# Patient Record
Sex: MALE | Race: WHITE | Employment: OTHER | URBAN - METROPOLITAN AREA
[De-identification: names, ages, dates, MRNs, and addresses within clinical notes are randomized per-mention and may not be internally consistent; named-entity substitution may affect disease eponyms.]

---

## 2017-01-03 ENCOUNTER — APPOINTMENT (OUTPATIENT)
Dept: GENERAL RADIOLOGY | Age: 82
End: 2017-01-03
Attending: EMERGENCY MEDICINE

## 2017-01-03 ENCOUNTER — HOSPITAL ENCOUNTER (EMERGENCY)
Age: 82
Discharge: HOME | End: 2017-01-03
Attending: EMERGENCY MEDICINE

## 2017-01-03 VITALS
HEIGHT: 65 IN | HEART RATE: 70 BPM | BODY MASS INDEX: 25.83 KG/M2 | WEIGHT: 155 LBS | DIASTOLIC BLOOD PRESSURE: 72 MMHG | RESPIRATION RATE: 16 BRPM | SYSTOLIC BLOOD PRESSURE: 144 MMHG | OXYGEN SATURATION: 96 % | TEMPERATURE: 98 F

## 2017-01-03 DIAGNOSIS — R09.89 FOREIGN BODY SENSATION IN THROAT: Primary | ICD-10-CM

## 2017-01-03 LAB
ATRIAL RATE: 69 BPM
P AXIS: 53 DEGREES
P-R INTERVAL: 160 MS
Q-T INTERVAL: 392 MS
QRS DURATION: 94 MS
QTC CALCULATION (BEZET): 420 MS
R AXIS: -45 DEGREES
T AXIS: 21 DEGREES
VENTRICULAR RATE: 69 BPM

## 2017-01-03 PROCEDURE — 99284 EMERGENCY DEPT VISIT MOD MDM: CPT

## 2017-01-03 PROCEDURE — 93010 ELECTROCARDIOGRAM REPORT: CPT

## 2017-01-03 PROCEDURE — 93005 ELECTROCARDIOGRAM TRACING: CPT

## 2017-01-03 PROCEDURE — 70360 X-RAY EXAM OF NECK: CPT

## 2017-01-03 NOTE — ED PROVIDER NOTES
Patient Seen in: THE [facility] Emergency Department In [place]    History   Patient presents with:  Sore Throat    Stated Complaint: c/o \"flu-like Sx 2-3 weeks - better but now c/o throat pain-swelling in throat\"    HPI    15-year-old male who recovered fr needed. benzonatate (TESSALON) 200 MG Oral Cap,  Take 1 capsule (200 mg total) by mouth 3 (three) times daily as needed. AMLODIPINE BESYLATE 2.5 MG Oral Tab,  TAKE 1 TABLET BY MOUTH DAILY.    Lovastatin 10 MG Oral Tab,  Take 1 tablet (10 mg total) by mo Current:/72 mmHg  Pulse 88  Temp(Src) 98.2 °F (36.8 °C) (Temporal)  Resp 16  Ht 165.1 cm (5' 5\")  Wt 70.308 kg  BMI 25.79 kg/m2  SpO2 96%        Physical Exam   Constitutional: He is oriented to person, place, and time.  He appears well-develop relaxed position reading a book. He is in no respiratory distress. There is no stridor there is no change in his voice. He is tolerating p.o. A screening EKG was done because of age and is unremarkable.   Furthermore x-rays do not show any abnormalities

## 2017-01-04 RX ORDER — LORAZEPAM 0.5 MG/1
TABLET ORAL
Qty: 90 TABLET | Refills: 0 | Status: SHIPPED | OUTPATIENT
Start: 2017-01-04 | End: 2017-10-26

## 2017-01-21 ENCOUNTER — HOSPITAL ENCOUNTER (OUTPATIENT)
Age: 82
Discharge: ANOTHER HOSPITAL | End: 2017-01-21
Attending: FAMILY MEDICINE
Payer: MEDICARE

## 2017-01-21 ENCOUNTER — APPOINTMENT (OUTPATIENT)
Dept: GENERAL RADIOLOGY | Facility: HOSPITAL | Age: 82
End: 2017-01-21
Payer: MEDICARE

## 2017-01-21 ENCOUNTER — APPOINTMENT (OUTPATIENT)
Dept: CT IMAGING | Facility: HOSPITAL | Age: 82
End: 2017-01-21
Attending: EMERGENCY MEDICINE
Payer: MEDICARE

## 2017-01-21 ENCOUNTER — HOSPITAL ENCOUNTER (EMERGENCY)
Facility: HOSPITAL | Age: 82
Discharge: HOME | End: 2017-01-21
Attending: EMERGENCY MEDICINE
Payer: MEDICARE

## 2017-01-21 VITALS
OXYGEN SATURATION: 97 % | TEMPERATURE: 98 F | HEART RATE: 100 BPM | WEIGHT: 155 LBS | BODY MASS INDEX: 26 KG/M2 | DIASTOLIC BLOOD PRESSURE: 84 MMHG | RESPIRATION RATE: 18 BRPM | SYSTOLIC BLOOD PRESSURE: 168 MMHG

## 2017-01-21 VITALS
RESPIRATION RATE: 16 BRPM | SYSTOLIC BLOOD PRESSURE: 148 MMHG | HEART RATE: 78 BPM | TEMPERATURE: 98 F | DIASTOLIC BLOOD PRESSURE: 71 MMHG | OXYGEN SATURATION: 97 %

## 2017-01-21 DIAGNOSIS — M79.622 PAIN IN BOTH UPPER ARMS: ICD-10-CM

## 2017-01-21 DIAGNOSIS — N28.89 LEFT KIDNEY MASS: ICD-10-CM

## 2017-01-21 DIAGNOSIS — M54.9 UPPER BACK PAIN: Primary | ICD-10-CM

## 2017-01-21 DIAGNOSIS — M54.9 MID BACK PAIN: Primary | ICD-10-CM

## 2017-01-21 DIAGNOSIS — M79.621 PAIN IN BOTH UPPER ARMS: ICD-10-CM

## 2017-01-21 LAB
ALBUMIN SERPL-MCNC: 4.1 G/DL (ref 3.5–4.8)
ALP LIVER SERPL-CCNC: 66 U/L (ref 45–117)
ALT SERPL-CCNC: 38 U/L (ref 17–63)
APTT PPP: 29.8 SECONDS (ref 25–34)
AST SERPL-CCNC: 27 U/L (ref 15–41)
ATRIAL RATE: 94 BPM
ATRIAL RATE: 95 BPM
BASOPHILS # BLD AUTO: 0.02 X10(3) UL (ref 0–0.1)
BASOPHILS NFR BLD AUTO: 0.5 %
BILIRUB SERPL-MCNC: 0.7 MG/DL (ref 0.1–2)
BUN BLD-MCNC: 29 MG/DL (ref 8–20)
CALCIUM BLD-MCNC: 9.4 MG/DL (ref 8.3–10.3)
CHLORIDE: 103 MMOL/L (ref 101–111)
CO2: 30 MMOL/L (ref 22–32)
CREAT BLD-MCNC: 1.33 MG/DL (ref 0.7–1.3)
EOSINOPHIL # BLD AUTO: 0.07 X10(3) UL (ref 0–0.3)
EOSINOPHIL NFR BLD AUTO: 1.7 %
ERYTHROCYTE [DISTWIDTH] IN BLOOD BY AUTOMATED COUNT: 14 % (ref 11.5–16)
GLUCOSE BLD-MCNC: 129 MG/DL (ref 70–99)
HCT VFR BLD AUTO: 44 % (ref 37–53)
HGB BLD-MCNC: 15.1 G/DL (ref 13–17)
IMMATURE GRANULOCYTE COUNT: 0.05 X10(3) UL (ref 0–1)
IMMATURE GRANULOCYTE RATIO %: 1.2 %
INR BLD: 0.88 (ref 0.89–1.12)
LYMPHOCYTES # BLD AUTO: 0.83 X10(3) UL (ref 0.9–4)
LYMPHOCYTES NFR BLD AUTO: 19.9 %
M PROTEIN MFR SERPL ELPH: 7.7 G/DL (ref 6.1–8.3)
MCH RBC QN AUTO: 31.5 PG (ref 27–33.2)
MCHC RBC AUTO-ENTMCNC: 34.3 G/DL (ref 31–37)
MCV RBC AUTO: 91.7 FL (ref 80–99)
MONOCYTES # BLD AUTO: 0.38 X10(3) UL (ref 0.1–0.6)
MONOCYTES NFR BLD AUTO: 9.1 %
NEUTROPHIL ABS PRELIM: 2.83 X10 (3) UL (ref 1.3–6.7)
NEUTROPHILS # BLD AUTO: 2.83 X10(3) UL (ref 1.3–6.7)
NEUTROPHILS NFR BLD AUTO: 67.6 %
P AXIS: 62 DEGREES
P AXIS: 66 DEGREES
P-R INTERVAL: 156 MS
P-R INTERVAL: 156 MS
PLATELET # BLD AUTO: 244 10(3)UL (ref 150–450)
POTASSIUM SERPL-SCNC: 3.7 MMOL/L (ref 3.6–5.1)
PSA SERPL DL<=0.01 NG/ML-MCNC: 12.2 SECONDS (ref 12.3–14.8)
Q-T INTERVAL: 340 MS
Q-T INTERVAL: 344 MS
QRS DURATION: 90 MS
QRS DURATION: 92 MS
QTC CALCULATION (BEZET): 427 MS
QTC CALCULATION (BEZET): 430 MS
R AXIS: -43 DEGREES
R AXIS: -53 DEGREES
RBC # BLD AUTO: 4.8 X10(6)UL (ref 3.8–5.8)
RED CELL DISTRIBUTION WIDTH-SD: 47 FL (ref 35.1–46.3)
SODIUM SERPL-SCNC: 140 MMOL/L (ref 136–144)
T AXIS: 13 DEGREES
T AXIS: 34 DEGREES
TROPONIN: <0.046 NG/ML (ref ?–0.05)
VENTRICULAR RATE: 94 BPM
VENTRICULAR RATE: 95 BPM
WBC # BLD AUTO: 4.2 X10(3) UL (ref 4–13)

## 2017-01-21 PROCEDURE — 85730 THROMBOPLASTIN TIME PARTIAL: CPT | Performed by: EMERGENCY MEDICINE

## 2017-01-21 PROCEDURE — 93005 ELECTROCARDIOGRAM TRACING: CPT

## 2017-01-21 PROCEDURE — 99215 OFFICE O/P EST HI 40 MIN: CPT

## 2017-01-21 PROCEDURE — 99285 EMERGENCY DEPT VISIT HI MDM: CPT

## 2017-01-21 PROCEDURE — 71010 XR CHEST AP PORTABLE  (CPT=71010): CPT

## 2017-01-21 PROCEDURE — 85025 COMPLETE CBC W/AUTO DIFF WBC: CPT

## 2017-01-21 PROCEDURE — 85610 PROTHROMBIN TIME: CPT | Performed by: EMERGENCY MEDICINE

## 2017-01-21 PROCEDURE — 96361 HYDRATE IV INFUSION ADD-ON: CPT

## 2017-01-21 PROCEDURE — 4040F PNEUMOC VAC/ADMIN/RCVD: CPT

## 2017-01-21 PROCEDURE — 84484 ASSAY OF TROPONIN QUANT: CPT

## 2017-01-21 PROCEDURE — 93010 ELECTROCARDIOGRAM REPORT: CPT

## 2017-01-21 PROCEDURE — 80053 COMPREHEN METABOLIC PANEL: CPT

## 2017-01-21 PROCEDURE — 71275 CT ANGIOGRAPHY CHEST: CPT

## 2017-01-21 PROCEDURE — 74175 CTA ABDOMEN W/CONTRAST: CPT

## 2017-01-21 PROCEDURE — 96374 THER/PROPH/DIAG INJ IV PUSH: CPT

## 2017-01-21 RX ORDER — ASPIRIN 81 MG/1
324 TABLET, CHEWABLE ORAL ONCE
Status: COMPLETED | OUTPATIENT
Start: 2017-01-21 | End: 2017-01-21

## 2017-01-21 RX ORDER — ONDANSETRON 2 MG/ML
4 INJECTION INTRAMUSCULAR; INTRAVENOUS ONCE
Status: COMPLETED | OUTPATIENT
Start: 2017-01-21 | End: 2017-01-21

## 2017-01-21 RX ORDER — SODIUM CHLORIDE 9 MG/ML
1000 INJECTION, SOLUTION INTRAVENOUS ONCE
Status: COMPLETED | OUTPATIENT
Start: 2017-01-21 | End: 2017-01-21

## 2017-01-21 NOTE — ED INITIAL ASSESSMENT (HPI)
Pt to ED from [address] [place] [address] with c/o mid upper back pain since yesterday evening. Pt states last night the pain radiated down both arms, denies arm pain today. Pt c/o nausea xseveral weeks.

## 2017-01-21 NOTE — ED PROVIDER NOTES
Patient Seen in: [address] [facility]    History   Patient presents with:  Chest Pain Angina (cardiovascular)    Stated Complaint: YUSEF SHOULDER BLADE PAIN/YUSEF.  ARM PAIN    HPI    40-year-old male who presents to the clinic today with chief com repair       Medications :   LORAZEPAM 0.5 MG Oral Tab,  TAKE 1 TABLET BY MOUTH THREE TIMES DAILY AS NEEDED   Albuterol Sulfate HFA (PROAIR HFA) 108 (90 BASE) MCG/ACT Inhalation Aero Soln,  Inhale 2 puffs into the lungs every 6 (six) hours as needed for [employer] air)       Current:/84 mmHg  Pulse 100  Temp(Src) 98 °F (36.7 °C) (Temporal)  Resp 18  Wt 70.308 kg  SpO2 97%        Physical Exam    General appearance: alert, appears stated age and cooperative  Head: Normocephalic, without obvious abnormality, atr pain  (primary encounter diagnosis)  Pain in both upper arms    Disposition:   Ic to ed    Follow-up:  [facility] [phone]  Go to  For higher level of care and further evaluation      Medications

## 2017-01-21 NOTE — ED NOTES
Pt and family discussing transfer to [facility] in [place]. [address] ambulance called for transport to ED. Family at bedside.

## 2017-01-21 NOTE — ED INITIAL ASSESSMENT (HPI)
Pt presents to [facility] with an episode of upper back and bilateral arm pain last night. Pt states he has had some chest pain off and on recently, none today. Pt states he is having pain between his shoulder blades at this time, rated 2/10.  Pt denies arm pain o

## 2017-01-22 NOTE — ED PROVIDER NOTES
Patient Seen in: [facility] Emergency Department    History   Patient presents with:  Back Pain (musculoskeletal)    Stated Complaint: back pain    HPI    Patient is an 80-year-old male who states last night around 3 AM he woke up with sharp pain be Aero Soln,  Inhale 2 puffs into the lungs every 6 (six) hours as needed for Wheezing. AMLODIPINE BESYLATE 2.5 MG Oral Tab,  TAKE 1 TABLET BY MOUTH DAILY. Lovastatin 10 MG Oral Tab,  Take 1 tablet (10 mg total) by mouth nightly.    HYDROCHLOROTHIAZIDE 25 Extraocular muscles intact, pupils equal round reactive to light and accommodation. Mouth normal, neck supple, no meningismus. LUNGS: Lungs clear to auscultation bilaterally.   Mild tenderness right parascapular spinal musculature, no crepitus or erythema results for these tests on the individual orders. RAINBOW DRAW BLUE   RAINBOW DRAW GOLD   RAINBOW DRAW LAVENDER   RAINBOW DRAW LIGHT GREEN      EKG    Rate, intervals and axes as noted on EKG Report.   Rate: 94  Rhythm: Sinus Rhythm  Reading: Normal sinus

## 2017-02-01 RX ORDER — IRBESARTAN 300 MG/1
TABLET ORAL
Qty: 90 TABLET | Refills: 3 | Status: SHIPPED | OUTPATIENT
Start: 2017-02-01 | End: 2018-01-31

## 2017-02-01 NOTE — TELEPHONE ENCOUNTER
Last refill: 8-28-15 6 refills, discontinued  Last Visit: 12-21-16  Next Visit: No future appointments. Forward to Dr. Cathern Hatchet please advise on refills. Thanks.

## 2017-03-01 ENCOUNTER — OFFICE VISIT (OUTPATIENT)
Dept: FAMILY MEDICINE CLINIC | Facility: CLINIC | Age: 82
End: 2017-03-01

## 2017-03-01 VITALS
BODY MASS INDEX: 25.75 KG/M2 | HEART RATE: 80 BPM | HEIGHT: 64 IN | DIASTOLIC BLOOD PRESSURE: 70 MMHG | RESPIRATION RATE: 14 BRPM | TEMPERATURE: 98 F | SYSTOLIC BLOOD PRESSURE: 180 MMHG | WEIGHT: 150.81 LBS

## 2017-03-01 DIAGNOSIS — I10 ESSENTIAL HYPERTENSION, BENIGN: ICD-10-CM

## 2017-03-01 DIAGNOSIS — I10 WHITE COAT SYNDROME WITH HYPERTENSION: ICD-10-CM

## 2017-03-01 DIAGNOSIS — C67.9 MALIGNANT NEOPLASM OF URINARY BLADDER, UNSPECIFIED SITE (HCC): Primary | ICD-10-CM

## 2017-03-01 DIAGNOSIS — N28.89 KIDNEY MASS: ICD-10-CM

## 2017-03-01 DIAGNOSIS — R39.89 BLADDER PAIN: ICD-10-CM

## 2017-03-01 DIAGNOSIS — N40.1 BENIGN PROSTATIC HYPERPLASIA WITH LOWER URINARY TRACT SYMPTOMS, UNSPECIFIED MORPHOLOGY: ICD-10-CM

## 2017-03-01 PROCEDURE — 99215 OFFICE O/P EST HI 40 MIN: CPT | Performed by: FAMILY MEDICINE

## 2017-03-01 RX ORDER — PHENAZOPYRIDINE HYDROCHLORIDE 100 MG/1
100 TABLET, FILM COATED ORAL 3 TIMES DAILY PRN
Qty: 30 TABLET | Refills: 1 | Status: SHIPPED | OUTPATIENT
Start: 2017-03-01 | End: 2017-09-11

## 2017-03-18 ENCOUNTER — MED REC SCAN ONLY (OUTPATIENT)
Dept: FAMILY MEDICINE CLINIC | Facility: CLINIC | Age: 82
End: 2017-03-18

## 2017-06-05 ENCOUNTER — TELEPHONE (OUTPATIENT)
Dept: FAMILY MEDICINE CLINIC | Facility: CLINIC | Age: 82
End: 2017-06-05

## 2017-06-05 ENCOUNTER — NURSE ONLY (OUTPATIENT)
Dept: FAMILY MEDICINE CLINIC | Facility: CLINIC | Age: 82
End: 2017-06-05

## 2017-06-05 DIAGNOSIS — C67.9 MALIGNANT NEOPLASM OF URINARY BLADDER, UNSPECIFIED SITE (HCC): Primary | ICD-10-CM

## 2017-06-05 DIAGNOSIS — C67.9 MALIGNANT NEOPLASM OF URINARY BLADDER, UNSPECIFIED SITE (HCC): ICD-10-CM

## 2017-06-05 PROCEDURE — 81001 URINALYSIS AUTO W/SCOPE: CPT | Performed by: FAMILY MEDICINE

## 2017-06-05 PROCEDURE — 87086 URINE CULTURE/COLONY COUNT: CPT | Performed by: FAMILY MEDICINE

## 2017-06-05 PROCEDURE — 87077 CULTURE AEROBIC IDENTIFY: CPT | Performed by: FAMILY MEDICINE

## 2017-06-05 NOTE — TELEPHONE ENCOUNTER
Pt dropped off a script for a UA and culture from Dr. Reyna Killian  Please fax results to [phone], ph# [phone]

## 2017-06-21 ENCOUNTER — OFFICE VISIT (OUTPATIENT)
Dept: FAMILY MEDICINE CLINIC | Facility: CLINIC | Age: 82
End: 2017-06-21

## 2017-06-21 VITALS
RESPIRATION RATE: 20 BRPM | SYSTOLIC BLOOD PRESSURE: 170 MMHG | TEMPERATURE: 99 F | BODY MASS INDEX: 27 KG/M2 | DIASTOLIC BLOOD PRESSURE: 70 MMHG | HEART RATE: 102 BPM | WEIGHT: 155 LBS

## 2017-06-21 DIAGNOSIS — N28.89 KIDNEY MASS: ICD-10-CM

## 2017-06-21 DIAGNOSIS — I10 WHITE COAT SYNDROME WITH HYPERTENSION: ICD-10-CM

## 2017-06-21 DIAGNOSIS — K86.9 PANCREATIC LESION: Primary | ICD-10-CM

## 2017-06-21 PROCEDURE — 99213 OFFICE O/P EST LOW 20 MIN: CPT | Performed by: FAMILY MEDICINE

## 2017-06-22 ENCOUNTER — MED REC SCAN ONLY (OUTPATIENT)
Dept: FAMILY MEDICINE CLINIC | Facility: CLINIC | Age: 82
End: 2017-06-22

## 2017-06-23 ENCOUNTER — HOSPITAL ENCOUNTER (EMERGENCY)
Age: 82
Discharge: HOME | End: 2017-06-23
Attending: EMERGENCY MEDICINE
Payer: MEDICARE

## 2017-06-23 ENCOUNTER — APPOINTMENT (OUTPATIENT)
Dept: ULTRASOUND IMAGING | Age: 82
End: 2017-06-23
Attending: EMERGENCY MEDICINE
Payer: MEDICARE

## 2017-06-23 VITALS
HEART RATE: 90 BPM | BODY MASS INDEX: 27 KG/M2 | TEMPERATURE: 98 F | RESPIRATION RATE: 20 BRPM | OXYGEN SATURATION: 98 % | WEIGHT: 156.5 LBS | SYSTOLIC BLOOD PRESSURE: 174 MMHG | DIASTOLIC BLOOD PRESSURE: 74 MMHG

## 2017-06-23 DIAGNOSIS — M79.604 PAIN IN BOTH LOWER EXTREMITIES: Primary | ICD-10-CM

## 2017-06-23 DIAGNOSIS — M79.605 PAIN IN BOTH LOWER EXTREMITIES: Primary | ICD-10-CM

## 2017-06-23 PROCEDURE — 99284 EMERGENCY DEPT VISIT MOD MDM: CPT

## 2017-06-23 PROCEDURE — 93970 EXTREMITY STUDY: CPT | Performed by: EMERGENCY MEDICINE

## 2017-06-23 RX ORDER — ACETAMINOPHEN 500 MG
1000 TABLET ORAL ONCE
Status: COMPLETED | OUTPATIENT
Start: 2017-06-23 | End: 2017-06-23
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
## 2017-06-25 NOTE — PROGRESS NOTES
Chayo Harrington is a 80year old male. HPI:   Pt is here to discuss an upcoming test his urologist would like ordered. Pt reports uro has ordered a CT scan to f/u on his kidney mass.  However, this fall he'll be due for an MRI to f/u on his pancreatic mass [facility]) [id number]    Bladder   • [facility]2013   • Essential hypertension    • Essential hypertension, benign 10/26/09   • History of pulmonary embolism 2/5/2013   • Impaired fasting glucose 7/15/10   • Kidney mass     stable x hypertension       - pt signed release form today to get prior imaging from [facility] to be uploaded and compared to CT scan from this past winter, will see if any obvious changes in growths and decide if further imaging warranted at this time    Pt has

## 2017-06-28 RX ORDER — LOVASTATIN 10 MG/1
TABLET ORAL
Qty: 90 TABLET | Refills: 0 | OUTPATIENT
Start: 2017-06-28

## 2017-06-28 RX ORDER — LOVASTATIN 10 MG/1
TABLET ORAL
Qty: 30 TABLET | Refills: 0 | Status: SHIPPED | OUTPATIENT
Start: 2017-06-28 | End: 2017-07-24

## 2017-06-28 NOTE — TELEPHONE ENCOUNTER
Last OV 6/21/17, Future Appointments  Date Time Provider Bel Silva   9/11/2017 10:20 AM Denisse Luu MD [facility]       Last rx given 11/30/16

## 2017-07-13 ENCOUNTER — HOSPITAL ENCOUNTER (OUTPATIENT)
Age: 82
Discharge: HOME | End: 2017-07-13
Attending: FAMILY MEDICINE
Payer: MEDICARE

## 2017-07-13 ENCOUNTER — TELEPHONE (OUTPATIENT)
Dept: FAMILY MEDICINE CLINIC | Facility: CLINIC | Age: 82
End: 2017-07-13

## 2017-07-13 ENCOUNTER — APPOINTMENT (OUTPATIENT)
Dept: GENERAL RADIOLOGY | Age: 82
End: 2017-07-13
Attending: FAMILY MEDICINE
Payer: MEDICARE

## 2017-07-13 VITALS
WEIGHT: 155 LBS | HEART RATE: 102 BPM | BODY MASS INDEX: 27 KG/M2 | TEMPERATURE: 99 F | RESPIRATION RATE: 16 BRPM | DIASTOLIC BLOOD PRESSURE: 72 MMHG | OXYGEN SATURATION: 97 % | SYSTOLIC BLOOD PRESSURE: 162 MMHG

## 2017-07-13 DIAGNOSIS — M62.838 NECK MUSCLE SPASM: Primary | ICD-10-CM

## 2017-07-13 PROCEDURE — 72050 X-RAY EXAM NECK SPINE 4/5VWS: CPT | Performed by: FAMILY MEDICINE

## 2017-07-13 PROCEDURE — 99214 OFFICE O/P EST MOD 30 MIN: CPT

## 2017-07-13 PROCEDURE — 99213 OFFICE O/P EST LOW 20 MIN: CPT

## 2017-07-13 RX ORDER — METAXALONE 800 MG/1
800 TABLET ORAL 3 TIMES DAILY
Qty: 21 TABLET | Refills: 0 | Status: SHIPPED | OUTPATIENT
Start: 2017-07-13 | End: 2017-07-20

## 2017-07-13 NOTE — ED INITIAL ASSESSMENT (HPI)
Patient c/o neck pain that started 4 days ago. States it started on his left side. Then moved to just the right side. Then yesterday and today, pt feels the stiffness and pain even without movement.  Patient reports taking ibuprofen and applying heat withou

## 2017-07-13 NOTE — TELEPHONE ENCOUNTER
Received via regular mail, 07/10/2017, requested med rec and disc from [facility]. Gave to Dr. Laura Pritchard today 07/13/2017.

## 2017-07-13 NOTE — ED PROVIDER NOTES
Patient Seen in: [address] [facility]    History   Patient presents with:  Neck Pain (musculoskeletal, neurologic)    Stated Complaint: NECK PAIN    HPI    17-year-old male presents to the clinic today with chief complaints of neck pain that s OTHER SURGICAL HISTORY      Comment: bladder cancer removal  No date: OTHER SURGICAL HISTORY      Comment: prostatectomy    Medications :   Metaxalone (SKELAXIN) 800 MG Oral Tab,  Take 1 tablet (800 mg total) by mouth 3 (three) times daily.    LOVASTATIN 10 reviewed. All other systems reviewed and negative except as noted above. PSFH elements reviewed from today and agreed except as otherwise stated in HPI.     Physical Exam   ED Triage Vitals [07/13/17 0908]  BP: (!) 162/80  Pulse: 102  Resp: 16  Temp INDICATIONS:  NECK PAIN  PATIENT STATED HISTORY: (As transcribed by Technologist)  Patient has had pain to neck that started on left side and has since moved to right side for the past 4 days without injury.  Patient states he is having pain and stiffness t

## 2017-07-22 ENCOUNTER — MED REC SCAN ONLY (OUTPATIENT)
Dept: FAMILY MEDICINE CLINIC | Facility: CLINIC | Age: 82
End: 2017-07-22

## 2017-07-25 ENCOUNTER — TELEPHONE (OUTPATIENT)
Dept: FAMILY MEDICINE CLINIC | Facility: CLINIC | Age: 82
End: 2017-07-25

## 2017-07-25 RX ORDER — LOVASTATIN 10 MG/1
TABLET ORAL
Qty: 30 TABLET | Refills: 11 | Status: SHIPPED | OUTPATIENT
Start: 2017-07-25 | End: 2018-07-24

## 2017-07-25 NOTE — TELEPHONE ENCOUNTER
Last OV 6/21/17, Future Appointments  Date Time Provider Bel Silva   9/11/2017 10:20 AM Paresh Go MD [facility] Valade[facility] Pour       Last rx given 6/28/17

## 2017-07-25 NOTE — TELEPHONE ENCOUNTER
Called Radiology file room and spoke with Fitz Tirado and aske her to have the CT from Jan 2017 sent out for comaprison to the MRI of the abdomen from [facility] 2016    She is sending it to be comapred

## 2017-08-05 RX ORDER — TAMSULOSIN HYDROCHLORIDE 0.4 MG/1
CAPSULE ORAL
Qty: 180 CAPSULE | Refills: 3 | Status: SHIPPED | OUTPATIENT
Start: 2017-08-05 | End: 2018-08-08

## 2017-08-05 NOTE — TELEPHONE ENCOUNTER
LOV  06/21/2017    Last refill    TAMSULOSIN HCL 0.4 MG Oral Cap 180 capsule 3 8/1/2016    Sig :  TAKE 1 CAPSULE(0.4 MG) BY MOUTH TWICE DAILY       Medication pending. Please advise.     Future Appointments  Date Time Provider Bel Silva   9/11/2017

## 2017-08-10 ENCOUNTER — MED REC SCAN ONLY (OUTPATIENT)
Dept: FAMILY MEDICINE CLINIC | Facility: CLINIC | Age: 82
End: 2017-08-10

## 2017-08-23 ENCOUNTER — HOSPITAL ENCOUNTER (OUTPATIENT)
Dept: MRI IMAGING | Facility: HOSPITAL | Age: 82
Discharge: HOME | End: 2017-08-23
Attending: FAMILY MEDICINE
Payer: MEDICARE

## 2017-08-23 DIAGNOSIS — N28.89 LEFT KIDNEY MASS: ICD-10-CM

## 2017-08-23 DIAGNOSIS — K86.2 PANCREATIC CYST: ICD-10-CM

## 2017-08-23 PROCEDURE — A9575 INJ GADOTERATE MEGLUMI 0.1ML: HCPCS | Performed by: FAMILY MEDICINE

## 2017-08-23 PROCEDURE — 74183 MRI ABD W/O CNTR FLWD CNTR: CPT | Performed by: FAMILY MEDICINE

## 2017-09-11 ENCOUNTER — OFFICE VISIT (OUTPATIENT)
Dept: FAMILY MEDICINE CLINIC | Facility: CLINIC | Age: 82
End: 2017-09-11

## 2017-09-11 VITALS
SYSTOLIC BLOOD PRESSURE: 170 MMHG | TEMPERATURE: 98 F | BODY MASS INDEX: 26 KG/M2 | WEIGHT: 152 LBS | RESPIRATION RATE: 16 BRPM | HEART RATE: 84 BPM | DIASTOLIC BLOOD PRESSURE: 82 MMHG

## 2017-09-11 DIAGNOSIS — I10 ESSENTIAL HYPERTENSION, BENIGN: ICD-10-CM

## 2017-09-11 DIAGNOSIS — M54.2 NECK PAIN: ICD-10-CM

## 2017-09-11 DIAGNOSIS — R91.8 LUNG NODULES: ICD-10-CM

## 2017-09-11 DIAGNOSIS — N28.89 KIDNEY MASS: ICD-10-CM

## 2017-09-11 DIAGNOSIS — Z86.711 HISTORY OF PULMONARY EMBOLISM: ICD-10-CM

## 2017-09-11 DIAGNOSIS — Z00.00 ENCOUNTER FOR ANNUAL HEALTH EXAMINATION: Primary | ICD-10-CM

## 2017-09-11 DIAGNOSIS — E78.00 PURE HYPERCHOLESTEROLEMIA: ICD-10-CM

## 2017-09-11 DIAGNOSIS — K86.9 PANCREATIC LESION: ICD-10-CM

## 2017-09-11 DIAGNOSIS — N40.1 BENIGN PROSTATIC HYPERPLASIA WITH URINARY FREQUENCY: ICD-10-CM

## 2017-09-11 DIAGNOSIS — R73.9 ELEVATED BLOOD SUGAR: ICD-10-CM

## 2017-09-11 DIAGNOSIS — J30.1 CHRONIC SEASONAL ALLERGIC RHINITIS DUE TO POLLEN: ICD-10-CM

## 2017-09-11 DIAGNOSIS — M62.838 MUSCLE SPASMS OF NECK: ICD-10-CM

## 2017-09-11 DIAGNOSIS — C67.9 MALIGNANT NEOPLASM OF URINARY BLADDER, UNSPECIFIED SITE (HCC): ICD-10-CM

## 2017-09-11 DIAGNOSIS — H91.93 BILATERAL HEARING LOSS, UNSPECIFIED HEARING LOSS TYPE: ICD-10-CM

## 2017-09-11 DIAGNOSIS — Z23 FLU VACCINE NEED: ICD-10-CM

## 2017-09-11 DIAGNOSIS — R35.0 BENIGN PROSTATIC HYPERPLASIA WITH URINARY FREQUENCY: ICD-10-CM

## 2017-09-11 DIAGNOSIS — I10 WHITE COAT SYNDROME WITH DIAGNOSIS OF HYPERTENSION: ICD-10-CM

## 2017-09-11 DIAGNOSIS — Z86.718 HISTORY OF DVT (DEEP VEIN THROMBOSIS): ICD-10-CM

## 2017-09-11 DIAGNOSIS — I87.003 POSTTHROMBOTIC SYNDROME OF BOTH LOWER EXTREMITIES: ICD-10-CM

## 2017-09-11 LAB
ALBUMIN SERPL-MCNC: 4 G/DL (ref 3.5–4.8)
ALP LIVER SERPL-CCNC: 68 U/L (ref 45–117)
ALT SERPL-CCNC: 37 U/L (ref 17–63)
AST SERPL-CCNC: 21 U/L (ref 15–41)
BILIRUB SERPL-MCNC: 0.7 MG/DL (ref 0.1–2)
BUN BLD-MCNC: 37 MG/DL (ref 8–20)
CALCIUM BLD-MCNC: 9.9 MG/DL (ref 8.3–10.3)
CHLORIDE: 107 MMOL/L (ref 101–111)
CHOLEST SMN-MCNC: 153 MG/DL (ref ?–200)
CO2: 29 MMOL/L (ref 22–32)
CREAT BLD-MCNC: 1.45 MG/DL (ref 0.7–1.3)
EST. AVERAGE GLUCOSE BLD GHB EST-MCNC: 137 MG/DL (ref 68–126)
GLUCOSE BLD-MCNC: 135 MG/DL (ref 70–99)
HBA1C MFR BLD HPLC: 6.4 % (ref ?–5.7)
HDLC SERPL-MCNC: 54 MG/DL (ref 45–?)
HDLC SERPL: 2.83 {RATIO} (ref ?–4.97)
LDLC SERPL CALC-MCNC: 76 MG/DL (ref ?–130)
LDLC SERPL-MCNC: 23 MG/DL (ref 5–40)
M PROTEIN MFR SERPL ELPH: 7.9 G/DL (ref 6.1–8.3)
NONHDLC SERPL-MCNC: 99 MG/DL (ref ?–130)
POTASSIUM SERPL-SCNC: 4.8 MMOL/L (ref 3.6–5.1)
SODIUM SERPL-SCNC: 140 MMOL/L (ref 136–144)
TRIGLYCERIDES: 113 MG/DL (ref ?–150)

## 2017-09-11 PROCEDURE — G0439 PPPS, SUBSEQ VISIT: HCPCS | Performed by: FAMILY MEDICINE

## 2017-09-11 PROCEDURE — 80061 LIPID PANEL: CPT | Performed by: FAMILY MEDICINE

## 2017-09-11 PROCEDURE — 90653 IIV ADJUVANT VACCINE IM: CPT | Performed by: FAMILY MEDICINE

## 2017-09-11 PROCEDURE — G0008 ADMIN INFLUENZA VIRUS VAC: HCPCS | Performed by: FAMILY MEDICINE

## 2017-09-11 PROCEDURE — 83036 HEMOGLOBIN GLYCOSYLATED A1C: CPT | Performed by: FAMILY MEDICINE

## 2017-09-11 PROCEDURE — 80053 COMPREHEN METABOLIC PANEL: CPT | Performed by: FAMILY MEDICINE

## 2017-09-11 RX ORDER — AZELASTINE HCL 205.5 UG/1
2 SPRAY NASAL 2 TIMES DAILY
Qty: 30 ML | Refills: 6 | Status: SHIPPED | OUTPATIENT
Start: 2017-09-11 | End: 2018-10-03

## 2017-09-11 NOTE — PROGRESS NOTES
HPI:   Mary Carmen Machado is a 80year old male who presents for a Medicare Subsequent Annual Wellness visit (Pt already had Initial Annual Wellness). Pt has seasonal allergies, usually worse in the fall, using Nasacort, but not working well.   Stuffy all symptoms      Last Cholesterol Labs:     Lab Results  Component Value Date   CHOLEST 153 09/11/2017   HDL 54 09/11/2017   LDL 76 09/11/2017   TRIG 113 09/11/2017          Last Chemistry Labs:     Lab Results  Component Value Date   AST 21 09/11/2017   ALT sinus pain or ST  LUNGS: denies shortness of breath with exertion  CARDIOVASCULAR: denies chest pain on exertion  GI: denies abdominal pain, denies heartburn  : see HPI; seeing uro  MUSCULOSKELETAL: denies new or unusual back pain  NEURO: denies headache turgor normal, no rashes or lesions   Lymph nodes: Cervical, supraclavicular, and axillary nodes normal   Neurologic: Normal              SUGGESTED VACCINATIONS - Influenza, Pneumococcal, Zoster, Tetanus     Immunization History   Administered Date(s) Admi daily  Kidney mass  -I will d/w his uro options of bx or no and will get back to pt  Essential hypertension, benign and White coat syndrome with diagnosis of hypertension  -pt will cont to check at home and if <150sbp regularly will keep meds where they ar without help    Driving: Able without help    Preparing your meals: Able without help    Managing money/bills: Able without help    Taking medications as prescribed: Able without help    Are you able to afford your medications?: Yes    Hearing Problems?: Y Procedure External Lab or Procedure   Diabetes Screening      HbgA1C   Annually HGBA1C (%)   Date Value   09/20/2012 5.8 (H)     HgbA1C (%)   Date Value   09/11/2017 6.4 (H)       No flowsheet data found.     Fasting Blood Sugar (FSB)Annually   Glucose (mg/ Date Value   09/11/2017 4.8     POTASSIUM (mmol/L)   Date Value   03/18/2014 4.3   01/08/2013 4.8    No flowsheet data found.     Creatinine  Annually CREATININE (mg/dL)   Date Value   03/18/2014 0.84     Creatinine (mg/dL)   Date Value   09/11/2017 1.45

## 2017-09-11 NOTE — PATIENT INSTRUCTIONS
Recommended Websites for Advanced Directives    [employer].no. org/publications/Documents/personal_dec. pdf  An information packet, including necessary form from the [employer] website. http://www. [place].state. [place]/public/books/adv

## 2017-09-18 RX ORDER — HYDROCHLOROTHIAZIDE 25 MG/1
TABLET ORAL
Qty: 90 TABLET | Refills: 3 | Status: ON HOLD | OUTPATIENT
Start: 2017-09-18 | End: 2018-04-28

## 2017-09-18 NOTE — TELEPHONE ENCOUNTER
Last refill - 9/23/16 - #90 with 3 refills  Last b/p - 9/11/17 - 170/82  Last BUN - 9/11/17 - 37  Last creatinine - 9/11/17 - 1.45

## 2017-09-19 ENCOUNTER — TELEPHONE (OUTPATIENT)
Dept: FAMILY MEDICINE CLINIC | Facility: CLINIC | Age: 82
End: 2017-09-19

## 2017-09-19 NOTE — TELEPHONE ENCOUNTER
Calling to see Dr. Patrica Nicolas recs on the L renal mass. He is out of the office until the end of sept, message being passed on to his nurse and covering providers. LMTCB.

## 2017-09-19 NOTE — TELEPHONE ENCOUNTER
Spoke with Dr. Vipul Parmar. They've had discussions about this lesion and in Dr. vSen Krause experience in the elderly the treatment of these lesion tend to be worse than the lesion itself as they rarely would be a cause of mortality in this age group.

## 2017-09-21 ENCOUNTER — HOSPITAL ENCOUNTER (OUTPATIENT)
Dept: CT IMAGING | Facility: HOSPITAL | Age: 82
Discharge: HOME | End: 2017-09-21
Attending: FAMILY MEDICINE
Payer: MEDICARE

## 2017-09-21 DIAGNOSIS — R91.8 LUNG NODULES: ICD-10-CM

## 2017-09-21 PROCEDURE — 71250 CT THORAX DX C-: CPT | Performed by: FAMILY MEDICINE

## 2017-09-28 ENCOUNTER — OFFICE VISIT (OUTPATIENT)
Dept: PHYSICAL THERAPY | Age: 82
End: 2017-09-28
Attending: FAMILY MEDICINE
Payer: MEDICARE

## 2017-09-28 DIAGNOSIS — M54.2 NECK PAIN: ICD-10-CM

## 2017-09-28 DIAGNOSIS — M62.838 MUSCLE SPASMS OF NECK: ICD-10-CM

## 2017-09-28 PROCEDURE — 97140 MANUAL THERAPY 1/> REGIONS: CPT

## 2017-09-28 PROCEDURE — 97161 PT EVAL LOW COMPLEX 20 MIN: CPT

## 2017-09-28 NOTE — PROGRESS NOTES
INITIAL EVALUATION:   Referring Physician: Dr. Mike Montano  Diagnosis:    Muscle spasms of neck (L98.479)  Neck pain (M54.2)   Date of Service: 9/28/2017     PATIENT Toma Pruett is a 80year old male  -Onset of neck pain 5 weeks ago; no injury or complaint  3. Indicate driving without out limitation.     Frequency / Duration:  -2 times per week 3 weeks  -Joint mobilization, soft tissue mobilization passive range of motion  -HEP active range of motion, flexibility    Education or treatment limitatio

## 2017-09-29 ENCOUNTER — APPOINTMENT (OUTPATIENT)
Dept: PHYSICAL THERAPY | Age: 82
End: 2017-09-29
Attending: FAMILY MEDICINE
Payer: MEDICARE

## 2017-10-05 ENCOUNTER — OFFICE VISIT (OUTPATIENT)
Dept: PHYSICAL THERAPY | Age: 82
End: 2017-10-05
Attending: FAMILY MEDICINE
Payer: MEDICARE

## 2017-10-05 ENCOUNTER — APPOINTMENT (OUTPATIENT)
Dept: PHYSICAL THERAPY | Age: 82
End: 2017-10-05
Attending: FAMILY MEDICINE
Payer: MEDICARE

## 2017-10-05 PROCEDURE — 97110 THERAPEUTIC EXERCISES: CPT

## 2017-10-05 PROCEDURE — 97140 MANUAL THERAPY 1/> REGIONS: CPT

## 2017-10-05 NOTE — PROGRESS NOTES
Dx:       Muscle spasms of neck (R78.054)  Neck pain (M54.2)    Authorized # of Visits:  No prior authorization required; initial plan 6 visits.           Next MD visit: none scheduled  Fall Risk: standard           Precautions: n/a             Subjective: cervical rotation   Instructed in mid thoracic mobilization using door frame or chair; parameters discussed                 Charges: Manual Therapy x 2;  Therapeutic excercise x 1       Total Timed Treatment: 40 min  Total Treatment Time: 40 min

## 2017-10-06 ENCOUNTER — APPOINTMENT (OUTPATIENT)
Dept: PHYSICAL THERAPY | Age: 82
End: 2017-10-06
Attending: FAMILY MEDICINE
Payer: MEDICARE

## 2017-10-12 ENCOUNTER — APPOINTMENT (OUTPATIENT)
Dept: PHYSICAL THERAPY | Age: 82
End: 2017-10-12
Attending: FAMILY MEDICINE
Payer: MEDICARE

## 2017-10-13 ENCOUNTER — OFFICE VISIT (OUTPATIENT)
Dept: PHYSICAL THERAPY | Age: 82
End: 2017-10-13
Attending: FAMILY MEDICINE
Payer: MEDICARE

## 2017-10-13 DIAGNOSIS — M62.838 MUSCLE SPASMS OF NECK: ICD-10-CM

## 2017-10-13 DIAGNOSIS — M54.2 NECK PAIN: ICD-10-CM

## 2017-10-13 PROCEDURE — 97110 THERAPEUTIC EXERCISES: CPT

## 2017-10-13 PROCEDURE — 97140 MANUAL THERAPY 1/> REGIONS: CPT

## 2017-10-13 NOTE — PROGRESS NOTES
Dx:       Muscle spasms of neck (N44.452)  Neck pain (M54.2)    Authorized # of Visits:  No prior authorization required; initial plan 6 visits.           Next MD visit: none scheduled  Fall Risk: standard           Precautions: n/a             Subjective:

## 2017-10-27 ENCOUNTER — OFFICE VISIT (OUTPATIENT)
Dept: PHYSICAL THERAPY | Age: 82
End: 2017-10-27
Attending: FAMILY MEDICINE
Payer: MEDICARE

## 2017-10-27 PROCEDURE — 97530 THERAPEUTIC ACTIVITIES: CPT

## 2017-10-27 PROCEDURE — 97110 THERAPEUTIC EXERCISES: CPT

## 2017-10-27 RX ORDER — LORAZEPAM 0.5 MG/1
TABLET ORAL
Qty: 90 TABLET | Refills: 0 | Status: SHIPPED | OUTPATIENT
Start: 2017-10-27 | End: 2018-10-03

## 2017-10-27 NOTE — PROGRESS NOTES
Dx:       Muscle spasms of neck (S34.742)  Neck pain (M54.2)    Authorized # of Visits:  No prior authorization required; initial plan 6 visits.           Next MD visit: none scheduled  Fall Risk: standard           Precautions: n/a           Subjective:  P retraction, depression, cervical retraction with B shoulder external rotation in a standing positions. This was modified to tandem stance to prevent backward balance loss. Patient performs 5 reps with 10 second isometric hold x 5 sets.       Assessment: P distriction Manual cervical segmental distriction    Upper cervical mobilization OA forward nodding; upper thoracic spine mobilization Upper cervical mobilization OA forward nodding; upper thoracic spine mobilization    Reviewed active cervical rotation Re

## 2017-11-02 ENCOUNTER — OFFICE VISIT (OUTPATIENT)
Dept: PHYSICAL THERAPY | Age: 82
End: 2017-11-02
Attending: FAMILY MEDICINE
Payer: MEDICARE

## 2017-11-02 PROCEDURE — 97110 THERAPEUTIC EXERCISES: CPT

## 2017-11-02 PROCEDURE — 97140 MANUAL THERAPY 1/> REGIONS: CPT

## 2017-11-02 PROCEDURE — 97530 THERAPEUTIC ACTIVITIES: CPT

## 2017-11-02 NOTE — PROGRESS NOTES
Dx:       Muscle spasms of neck (P41.205)  Neck pain (M54.2)    Authorized # of Visits:  No prior authorization required; initial plan 6 visits.           Next MD visit: none scheduled  Fall Risk: standard           Precautions: n/a           Subjective:  P instruction for axial extension within standing and sitting postures   Manual cervical segmental distriction Manual cervical segmental distriction  Manual cervical segmental distriction   Upper cervical mobilization OA forward nodding; upper thoracic spine

## 2017-11-10 ENCOUNTER — APPOINTMENT (OUTPATIENT)
Dept: PHYSICAL THERAPY | Age: 82
End: 2017-11-10
Attending: FAMILY MEDICINE
Payer: MEDICARE

## 2017-11-17 ENCOUNTER — OFFICE VISIT (OUTPATIENT)
Dept: PHYSICAL THERAPY | Age: 82
End: 2017-11-17
Attending: FAMILY MEDICINE
Payer: MEDICARE

## 2017-11-17 PROCEDURE — 97110 THERAPEUTIC EXERCISES: CPT

## 2017-11-17 PROCEDURE — 97530 THERAPEUTIC ACTIVITIES: CPT

## 2017-11-17 NOTE — PROGRESS NOTES
Dx:       Muscle spasms of neck (C26.620)  Neck pain (M54.2)    Authorized # of Visits:  No prior authorization required; initial plan 6 visits.           Next MD visit: none scheduled  Fall Risk: standard           Precautions: n/a           Subjective:  P need for verbal cueing  -Status: MET 11/17/2017  5.   Patient demonstrate posture exercise and progression without need for verbal cueing demonstrating appropriate performance to enhance long term outcome  -Status: MET 11/17/2017    Plan: Discharge at this parameters discussed  Reviewed standing shoulder extension, cervical retraction, scapular retraction with red resistance band modified to against wall. -Reviewed scalene stretch B - 30 second hold x 3 x 3 sets per day.                                  Vitor

## 2017-11-28 ENCOUNTER — TELEPHONE (OUTPATIENT)
Dept: FAMILY MEDICINE CLINIC | Facility: CLINIC | Age: 82
End: 2017-11-28

## 2017-11-28 NOTE — TELEPHONE ENCOUNTER
Pt stopped in the office this morning and he states that he has been on abx for about a month for some dental procedures/abcess.  He was on Pcn first, then clinda then amox- he has been off of the abx for a few days now- he has been having diarrhea- he is c

## 2017-12-04 NOTE — TELEPHONE ENCOUNTER
LOV: 9/11/17  Last Refill: 12/3/16  330 11 refills    Future Appointments  Date Time Provider Bel Silva   12/6/2017 1:40 PM Lennox Alanis, MD [facility], 12/04/17, 5:43 PM

## 2017-12-05 RX ORDER — AMLODIPINE BESYLATE 2.5 MG/1
TABLET ORAL
Qty: 30 TABLET | Refills: 11 | Status: ON HOLD | OUTPATIENT
Start: 2017-12-05 | End: 2018-04-28

## 2017-12-06 ENCOUNTER — OFFICE VISIT (OUTPATIENT)
Dept: FAMILY MEDICINE CLINIC | Facility: CLINIC | Age: 82
End: 2017-12-06

## 2017-12-06 VITALS
TEMPERATURE: 98 F | WEIGHT: 149 LBS | HEART RATE: 84 BPM | RESPIRATION RATE: 16 BRPM | BODY MASS INDEX: 24.83 KG/M2 | SYSTOLIC BLOOD PRESSURE: 140 MMHG | HEIGHT: 65 IN | DIASTOLIC BLOOD PRESSURE: 60 MMHG

## 2017-12-06 DIAGNOSIS — R19.7 DIARRHEA OF PRESUMED INFECTIOUS ORIGIN: Primary | ICD-10-CM

## 2017-12-06 PROCEDURE — 99213 OFFICE O/P EST LOW 20 MIN: CPT | Performed by: FAMILY MEDICINE

## 2017-12-06 NOTE — PROGRESS NOTES
Navdeep Chowdhury is a 80year old male. HPI:   Pt is here for eval of GI issues. He was on 3 rounds of abx over 4 weeks for dental abscess: PCN, clinda, then amox (from Oct 17- Nov 13).     He tends toward constipation and after starting abx stools became daily.   Disp:  Rfl:    Elastic Bandages & Supports (MEDICAL COMPRESSION STOCKINGS) Does not apply Misc 1 Units by Does not apply route daily.  Disp: 2 Each Rfl: 1        HISTORY:  Past Medical History:   Diagnosis Date   • Cancer [facility]) [phone]    Bladder HSM or tenderness  EXTREMITIES: no cyanosis, clubbing or edema    ASSESSMENT AND PLAN:   Diagnoses and all orders for this visit:    Diarrhea of presumed infectious origin  -     STOOL CULTURE  -     C DIFFICILE TOXINS A+B, EIA    -d/w pt symptoms somewhat

## 2017-12-08 ENCOUNTER — MED REC SCAN ONLY (OUTPATIENT)
Dept: FAMILY MEDICINE CLINIC | Facility: CLINIC | Age: 82
End: 2017-12-08

## 2017-12-11 ENCOUNTER — TELEPHONE (OUTPATIENT)
Dept: FAMILY MEDICINE CLINIC | Facility: CLINIC | Age: 82
End: 2017-12-11

## 2017-12-11 RX ORDER — VANCOMYCIN HYDROCHLORIDE 125 MG/1
125 CAPSULE ORAL 4 TIMES DAILY
Qty: 40 CAPSULE | Refills: 0 | Status: SHIPPED | OUTPATIENT
Start: 2017-12-11 | End: 2017-12-21

## 2017-12-12 NOTE — TELEPHONE ENCOUNTER
Patient advise of test results - is already aware of positive c diff. Script was sent for vancomycin - instructions given. If not improved within 5 day to call. Needs to call office if develops fever/chills/abdominal pain. Can follow symptomatically.

## 2018-02-01 RX ORDER — IRBESARTAN 300 MG/1
TABLET ORAL
Qty: 90 TABLET | Refills: 0 | Status: SHIPPED | OUTPATIENT
Start: 2018-02-01 | End: 2018-04-30

## 2018-03-21 ENCOUNTER — OFFICE VISIT (OUTPATIENT)
Dept: FAMILY MEDICINE CLINIC | Facility: CLINIC | Age: 83
End: 2018-03-21

## 2018-03-21 VITALS
WEIGHT: 147.63 LBS | HEART RATE: 84 BPM | SYSTOLIC BLOOD PRESSURE: 160 MMHG | TEMPERATURE: 98 F | RESPIRATION RATE: 14 BRPM | BODY MASS INDEX: 25 KG/M2 | DIASTOLIC BLOOD PRESSURE: 70 MMHG

## 2018-03-21 DIAGNOSIS — Z86.19 HISTORY OF CLOSTRIDIUM DIFFICILE COLITIS: Primary | ICD-10-CM

## 2018-03-21 DIAGNOSIS — R73.9 ELEVATED BLOOD SUGAR: ICD-10-CM

## 2018-03-21 DIAGNOSIS — G47.33 OBSTRUCTIVE SLEEP APNEA SYNDROME: ICD-10-CM

## 2018-03-21 DIAGNOSIS — K04.7 DENTAL INFECTION: ICD-10-CM

## 2018-03-21 DIAGNOSIS — I10 WHITE COAT SYNDROME WITH DIAGNOSIS OF HYPERTENSION: ICD-10-CM

## 2018-03-21 PROCEDURE — 99214 OFFICE O/P EST MOD 30 MIN: CPT | Performed by: FAMILY MEDICINE

## 2018-03-21 NOTE — PROGRESS NOTES
HPI:    Patient ID: Wolfgang Riggs is a 80year old male. Pt is here to discuss antibiotic usage prior to dental implant placement.       He reminds me back in November he had a dental fracture and infection, his oral surgeon prescribed PCN VK which didn 1 tablet (5 mg total) by mouth daily. Disp:  Rfl: 0   aspirin EC 81 MG Oral Tab EC Take 1 tablet (81 mg total) by mouth daily.  (Patient taking differently: Take 325 mg by mouth daily.  ) Disp: 30 tablet Rfl: 11   Cholecalciferol (VITAMIN D) 2000 UNITS Oral

## 2018-04-27 ENCOUNTER — HOSPITAL ENCOUNTER (OUTPATIENT)
Facility: HOSPITAL | Age: 83
Setting detail: OBSERVATION
Discharge: HOME | End: 2018-04-28
Attending: EMERGENCY MEDICINE | Admitting: HOSPITALIST
Payer: MEDICARE

## 2018-04-27 ENCOUNTER — APPOINTMENT (OUTPATIENT)
Dept: GENERAL RADIOLOGY | Age: 83
End: 2018-04-27
Attending: EMERGENCY MEDICINE
Payer: MEDICARE

## 2018-04-27 DIAGNOSIS — E86.0 DEHYDRATION: ICD-10-CM

## 2018-04-27 DIAGNOSIS — N39.0 URINARY TRACT INFECTION WITHOUT HEMATURIA, SITE UNSPECIFIED: Primary | ICD-10-CM

## 2018-04-27 PROBLEM — N17.9 ACUTE KIDNEY INJURY (HCC): Status: ACTIVE | Noted: 2018-04-27

## 2018-04-27 PROCEDURE — 71045 X-RAY EXAM CHEST 1 VIEW: CPT | Performed by: EMERGENCY MEDICINE

## 2018-04-27 PROCEDURE — 99220 INITIAL OBSERVATION CARE,LEVL III: CPT | Performed by: HOSPITALIST

## 2018-04-27 RX ORDER — ALFUZOSIN HYDROCHLORIDE 10 MG/1
10 TABLET, EXTENDED RELEASE ORAL
Status: DISCONTINUED | OUTPATIENT
Start: 2018-04-28 | End: 2018-04-28

## 2018-04-27 RX ORDER — AZELASTINE 1 MG/ML
2 SPRAY, METERED NASAL 2 TIMES DAILY
Status: DISCONTINUED | OUTPATIENT
Start: 2018-04-27 | End: 2018-04-28

## 2018-04-27 RX ORDER — ACETAMINOPHEN 325 MG/1
650 TABLET ORAL EVERY 6 HOURS PRN
Status: DISCONTINUED | OUTPATIENT
Start: 2018-04-27 | End: 2018-04-28

## 2018-04-27 RX ORDER — ONDANSETRON 2 MG/ML
4 INJECTION INTRAMUSCULAR; INTRAVENOUS EVERY 6 HOURS PRN
Status: DISCONTINUED | OUTPATIENT
Start: 2018-04-27 | End: 2018-04-28

## 2018-04-27 RX ORDER — AMLODIPINE BESYLATE 2.5 MG/1
2.5 TABLET ORAL
Status: DISCONTINUED | OUTPATIENT
Start: 2018-04-28 | End: 2018-04-28

## 2018-04-27 RX ORDER — FINASTERIDE 5 MG/1
5 TABLET, FILM COATED ORAL DAILY
Status: DISCONTINUED | OUTPATIENT
Start: 2018-04-28 | End: 2018-04-28

## 2018-04-27 RX ORDER — ENOXAPARIN SODIUM 100 MG/ML
30 INJECTION SUBCUTANEOUS DAILY
Status: DISCONTINUED | OUTPATIENT
Start: 2018-04-27 | End: 2018-04-28

## 2018-04-27 RX ORDER — ASPIRIN 325 MG
325 TABLET, DELAYED RELEASE (ENTERIC COATED) ORAL NIGHTLY
Status: DISCONTINUED | OUTPATIENT
Start: 2018-04-27 | End: 2018-04-28

## 2018-04-27 RX ORDER — BISACODYL 10 MG
10 SUPPOSITORY, RECTAL RECTAL
Status: DISCONTINUED | OUTPATIENT
Start: 2018-04-27 | End: 2018-04-28

## 2018-04-27 RX ORDER — SODIUM CHLORIDE 9 MG/ML
INJECTION, SOLUTION INTRAVENOUS CONTINUOUS
Status: DISCONTINUED | OUTPATIENT
Start: 2018-04-27 | End: 2018-04-28

## 2018-04-27 RX ORDER — PRAVASTATIN SODIUM 10 MG
10 TABLET ORAL NIGHTLY
Status: DISCONTINUED | OUTPATIENT
Start: 2018-04-27 | End: 2018-04-28

## 2018-04-27 RX ORDER — DOCUSATE SODIUM 100 MG/1
100 CAPSULE, LIQUID FILLED ORAL 2 TIMES DAILY
Status: DISCONTINUED | OUTPATIENT
Start: 2018-04-27 | End: 2018-04-28

## 2018-04-27 RX ORDER — POLYETHYLENE GLYCOL 3350 17 G/17G
17 POWDER, FOR SOLUTION ORAL DAILY PRN
Status: DISCONTINUED | OUTPATIENT
Start: 2018-04-27 | End: 2018-04-28

## 2018-04-27 RX ORDER — GARLIC EXTRACT 500 MG
1 CAPSULE ORAL DAILY
Status: DISCONTINUED | OUTPATIENT
Start: 2018-04-28 | End: 2018-04-28

## 2018-04-27 RX ORDER — LORAZEPAM 0.5 MG/1
0.5 TABLET ORAL 3 TIMES DAILY PRN
Status: DISCONTINUED | OUTPATIENT
Start: 2018-04-27 | End: 2018-04-28

## 2018-04-27 RX ORDER — SODIUM CHLORIDE 9 MG/ML
INJECTION, SOLUTION INTRAVENOUS CONTINUOUS
Status: ACTIVE | OUTPATIENT
Start: 2018-04-27 | End: 2018-04-27

## 2018-04-27 RX ORDER — GARLIC EXTRACT 500 MG
1 CAPSULE ORAL DAILY
COMMUNITY
End: 2019-11-12

## 2018-04-27 RX ORDER — LOSARTAN POTASSIUM 100 MG/1
100 TABLET ORAL NIGHTLY
Status: DISCONTINUED | OUTPATIENT
Start: 2018-04-27 | End: 2018-04-28

## 2018-04-27 RX ORDER — SODIUM CHLORIDE 9 MG/ML
125 INJECTION, SOLUTION INTRAVENOUS CONTINUOUS
Status: DISCONTINUED | OUTPATIENT
Start: 2018-04-27 | End: 2018-04-27

## 2018-04-27 NOTE — ED PROVIDER NOTES
Patient Seen in: THE [facility] Emergency Department In [place]    History   Patient presents with:  Arrythmia/Palpitations (cardiovascular)    Stated Complaint: rapid heart rate    HPI  This is a 51-year-old male who arrives here with complaints of palpitati are as noted in HPI. Constitutional and vital signs reviewed. All other systems reviewed and negative except as noted above.     Physical Exam   ED Triage Vitals  BP: (!) 168/79 [04/27/18 1718]  Pulse: 128 [04/27/18 1718]  Resp: 20 [04/27/18 1718]  Te REFLEX CULTURE - Abnormal; Notable for the following:     WBC Urine 5-10 (*)     RBC URINE 3-5 (*)     All other components within normal limits   CBC W/ DIFFERENTIAL - Abnormal; Notable for the following:     RDW-SD 47.0 (*)     All other components withi consistent with a urinary tract infection, blood cultures obtained, lactic acid was obtained. The patient was given IV fluids, ceftriaxone was given.     Xr Chest Ap Portable  (cpt=71045)    Result Date: 4/27/2018  PROCEDURE:  XR CHEST AP PORTABLE  (CPT=71 Medication List        Present on Admission  Date Reviewed: 3/21/2018          ICD-10-CM Noted POA    Urinary tract infection without hematuria N39.0 4/27/2018 Unknown

## 2018-04-28 VITALS
TEMPERATURE: 98 F | WEIGHT: 142.13 LBS | BODY MASS INDEX: 23.68 KG/M2 | OXYGEN SATURATION: 100 % | DIASTOLIC BLOOD PRESSURE: 48 MMHG | HEART RATE: 63 BPM | SYSTOLIC BLOOD PRESSURE: 126 MMHG | RESPIRATION RATE: 18 BRPM | HEIGHT: 65 IN

## 2018-04-28 PROCEDURE — 99225 SUBSEQUENT OBSERVATION CARE: CPT | Performed by: HOSPITALIST

## 2018-04-28 RX ORDER — POTASSIUM CHLORIDE 20 MEQ/1
40 TABLET, EXTENDED RELEASE ORAL ONCE
Status: DISCONTINUED | OUTPATIENT
Start: 2018-04-28 | End: 2018-04-28

## 2018-04-28 RX ORDER — AMLODIPINE BESYLATE 5 MG/1
5 TABLET ORAL
Qty: 30 TABLET | Refills: 0 | Status: SHIPPED | OUTPATIENT
Start: 2018-04-28 | End: 2018-05-14 | Stop reason: DRUGHIGH

## 2018-04-28 RX ORDER — AMLODIPINE BESYLATE 5 MG/1
5 TABLET ORAL
Status: DISCONTINUED | OUTPATIENT
Start: 2018-04-28 | End: 2018-04-28

## 2018-04-28 NOTE — PLAN OF CARE
NURSING ADMISSION NOTE      Patient admitted via Cart  Oriented to room. Safety precautions initiated. Bed in low position. Call light in reach. Pt A/O x 4. Rm air. Denies pain. Denies N/V/D. Reports mild burning w/urination.  Up ad paul and voidin

## 2018-04-28 NOTE — PROGRESS NOTES
[facility]  Progress Note     Hans Best Patient Status:  Observation    1934 MRN [id number]   [facility] 4NW-A Attending Shannan Thurman MD   Hosp Day # 0 PCP Neli Lorenzana MD     Chief Complaint: fatigue    S: Patient repo • AmLODIPine Besylate  5 mg Oral Daily   • Acidophilus/Pectin  1 capsule Oral Daily   • aspirin EC  325 mg Oral Nightly   • Azelastine HCl  2 spray Nasal BID   • finasteride  5 mg Oral Daily   • Losartan Potassium  100 mg Oral Nightly   • Pravastatin Sod

## 2018-04-28 NOTE — PROGRESS NOTES
Ambulates in hallway , independent , gait steady , tolerating diet well, HR ambulating =62-66, post activity B/P=135/56

## 2018-04-28 NOTE — PROGRESS NOTES
[facility]  Renal Dose Adjustment for Enoxaparin (LOVENOX)    Kirstie Joe has been prescribed Enoxaparin (LOVENOX) 40 mg subcutaneously every 24 hours. Estimated Creatinine Clearance: 28.5 mL/min (A) (based on SCr of 1.71 mg/dL (H)).     His c

## 2018-04-28 NOTE — H&P
[facility]  History and Physical     Diaz Best Patient Status:  Observation    1934 MRN [id number]   [facility] 4NW-A Attending Inocente Homans, MD   Hosp Day # 0 PCP Maritza Alexandre MD     Chief Complaint: Palpitations and alcohol or use drugs.     Family History:   Family History   Problem Relation Age of Onset   • Hypertension Sister    • Cancer Other      Colon   • Heart Disease Other    • Stroke Other        Allergies: No Known Allergies    Medications:    No current faci Alert and oriented x 3. HEENT: Normocephalic atraumatic. Moist mucous membranes. EOM-I. PERRLA. Anicteric. Neck: No lymphadenopathy. No JVD. No carotid bruits. Respiratory: Clear to auscultation bilaterally. No wheezes. No rhonchi.   Cardiovascular: S1, discussed with pt and spouse at bedside.     Baron Young,   4/27/2018

## 2018-04-28 NOTE — ED NOTES
Iv site flushed with normal saline and wrapped, pt dressed, and his wife is driving him to [facility] for admission

## 2018-04-28 NOTE — DISCHARGE SUMMARY
[facility]  DISCHARGE SUMMARY     Billy Best Patient Status:  Observation    1934 MRN [id number]   [facility] 4NW-A Attending Addie Lin MD   Hosp Day # 0 PCP Paty Lynn MD     Date of Admission: 2018  Date of D any focal weakness. No headache, sinus has nasal congestion, or cough. No hematochezia, melena, hematuria, hemoptysis, hematemesis. Brief Synopsis: Patient is a 80-year-old male with history of bladder cancer, hypertension.   He reported increasing fat Acidophilus/Pectin Caps  Commonly known as:  PROBIOTIC      Take 1 capsule by mouth daily. Refills:  0     Azelastine HCl 0.15 % Soln      2 sprays by Nasal route 2 (two) times daily.    Quantity:  30 mL  Refills:  6     finasteride 5 MG Tabs  Commonly

## 2018-04-28 NOTE — ED NOTES
Attempted to give report, nurse unable-stated is in report, will call me back.  Room assignment changed to 403

## 2018-04-30 ENCOUNTER — PATIENT OUTREACH (OUTPATIENT)
Dept: CASE MANAGEMENT | Age: 83
End: 2018-04-30

## 2018-04-30 ENCOUNTER — TELEPHONE (OUTPATIENT)
Dept: FAMILY MEDICINE CLINIC | Facility: CLINIC | Age: 83
End: 2018-04-30

## 2018-04-30 ENCOUNTER — PATIENT MESSAGE (OUTPATIENT)
Dept: FAMILY MEDICINE CLINIC | Facility: CLINIC | Age: 83
End: 2018-04-30

## 2018-04-30 ENCOUNTER — PATIENT MESSAGE (OUTPATIENT)
Dept: CASE MANAGEMENT | Age: 83
End: 2018-04-30

## 2018-04-30 DIAGNOSIS — R19.7 DIARRHEA, UNSPECIFIED TYPE: Primary | ICD-10-CM

## 2018-04-30 DIAGNOSIS — Z02.9 ENCOUNTERS FOR ADMINISTRATIVE PURPOSE: ICD-10-CM

## 2018-04-30 RX ORDER — IRBESARTAN 300 MG/1
TABLET ORAL
Qty: 90 TABLET | Refills: 0 | Status: SHIPPED | OUTPATIENT
Start: 2018-04-30 | End: 2018-07-27

## 2018-04-30 NOTE — TELEPHONE ENCOUNTER
Patient advised. Verbalized understanding. Coming 5/4 at 1:00 with Dr Camilo Luis.    Future Appointments  Date Time Provider Bel Silva   5/4/2018 1:00 PM Miriam Villagomez MD [facility] O[facility]o   9/12/2018 1:00 PM Miriam Villagomez MD [facility]o

## 2018-04-30 NOTE — TELEPHONE ENCOUNTER
Please let pt know I will look at records and please get him on schedule for 30min with me end of this week (tell him to call sooner for worsening symptoms and we'll call sooner if c dif results in before then which I imagine they will be

## 2018-04-30 NOTE — TELEPHONE ENCOUNTER
From: Demetra Best  To: Carlos Pierre MD  Sent: 4/30/2018 8:36 AM CDT  Subject: Non-Urgent Medical Question    On 4/27 heart 147 bpm;To Emily;analysis dehydration due to bladder infection;antibiotic IV lowered bpm;sent to [facility]

## 2018-04-30 NOTE — TELEPHONE ENCOUNTER
Last refilled on 2/1/18 for # 90 with 0 refills  Last seen on 3/21/18, /70  Future Appointments  Date Time Provider Bel Shira   5/4/2018 1:00 PM Preston Pena MD [facility] [place]   9/12/2018 1:00 PM Preston Pena MD [facility] [place]

## 2018-04-30 NOTE — PROGRESS NOTES
Pt walked into office today asking to be seen--was discharged from [facility] after staying a day, on abx for UTI, and a \"few meds\" were changed. He has diarrhea now (4 times/day) and wants to be seen.   I don't have room today, but I did order a C dif test

## 2018-04-30 NOTE — TELEPHONE ENCOUNTER
PT STOPPED IN TODAY WANTING TO SEE DR LOZANO. PT DID NOT WANT TO WAIT FOR TOMORROW. SPOKE WITH DR ABOUT PTS SX'S. PT WOULD LIKE DR TO LOOK AT [address] [place] [address] ER RECORDS AND [facility] AND LOOK AT TEST RESULTS.     PT GIVEN C-DIFF ORDER AND WILL GET THAT DO

## 2018-05-04 ENCOUNTER — OFFICE VISIT (OUTPATIENT)
Dept: FAMILY MEDICINE CLINIC | Facility: CLINIC | Age: 83
End: 2018-05-04

## 2018-05-04 VITALS
HEART RATE: 68 BPM | BODY MASS INDEX: 25.67 KG/M2 | WEIGHT: 152.19 LBS | HEIGHT: 64.5 IN | TEMPERATURE: 99 F | DIASTOLIC BLOOD PRESSURE: 65 MMHG | SYSTOLIC BLOOD PRESSURE: 155 MMHG | RESPIRATION RATE: 16 BRPM

## 2018-05-04 DIAGNOSIS — I10 ESSENTIAL HYPERTENSION: ICD-10-CM

## 2018-05-04 DIAGNOSIS — R19.7 DIARRHEA, UNSPECIFIED TYPE: ICD-10-CM

## 2018-05-04 DIAGNOSIS — E86.0 DEHYDRATION: ICD-10-CM

## 2018-05-04 DIAGNOSIS — Z09 HOSPITAL DISCHARGE FOLLOW-UP: Primary | ICD-10-CM

## 2018-05-04 DIAGNOSIS — N28.9 ACUTE RENAL INSUFFICIENCY: ICD-10-CM

## 2018-05-04 DIAGNOSIS — R00.0 SINUS TACHYCARDIA: ICD-10-CM

## 2018-05-04 PROCEDURE — 99495 TRANSJ CARE MGMT MOD F2F 14D: CPT | Performed by: FAMILY MEDICINE

## 2018-05-04 PROCEDURE — 1111F DSCHRG MED/CURRENT MED MERGE: CPT | Performed by: FAMILY MEDICINE

## 2018-05-04 NOTE — PROGRESS NOTES
HPI:    Osiris Mesa is a 80year old male here today for hospital follow up.    He was discharged from Observation status in a hospital, [facility] to Home   Admission Date: 4/27/18   Discharge Date: 4/28/18  Hospital Discharge Diagnoses (since 4/14/20 ordered, cdif test neg. Having 2-3BMs/day yet, but it is slowing/improving. No mucous in it like he did with cdif. No pain/fevers/chills.      Home BP readings since hospital d/c:  SBP averaging ~145 DBP mid 60s, HR averaging mid 60s    Overall reports fe surgical history that includes other surgical history; other surgical history; hernia surgery; cataract; cholecystectomy; laparoscopy, surgical prostatectomy, retropubic radical, w/nerve sparing; and other.     He family history includes Cancer in an other possibly related to HCTZ side effect (though odd for it to be so acute, suspect pt had gotten a bit dehydrated days leading up to ER visit with his increased activity and HCTZ lowered threshold for sig symptmoatic dehydration) and certainly reasonable to h

## 2018-05-07 ENCOUNTER — MED REC SCAN ONLY (OUTPATIENT)
Dept: FAMILY MEDICINE CLINIC | Facility: CLINIC | Age: 83
End: 2018-05-07

## 2018-05-07 NOTE — PROGRESS NOTES
Multiple attempts to reach the pt and messages left with no returned phone call. Pt went to [facility]U with PCP on 5/4/18. Closing encounter.

## 2018-05-14 ENCOUNTER — TELEPHONE (OUTPATIENT)
Dept: FAMILY MEDICINE CLINIC | Facility: CLINIC | Age: 83
End: 2018-05-14

## 2018-05-14 RX ORDER — AMLODIPINE BESYLATE 10 MG/1
10 TABLET ORAL DAILY
Qty: 30 TABLET | Refills: 0 | COMMUNITY
Start: 2018-05-14 | End: 2018-05-21 | Stop reason: SINTOL

## 2018-05-14 NOTE — TELEPHONE ENCOUNTER
Called the pt back and he states that his BP is all over the place  He noticed this weekend his systolic BP has been running high  He was told in the past that sometimes being dehydrated can drive up his BP  The pt tells me that his systolic BP last night

## 2018-05-14 NOTE — TELEPHONE ENCOUNTER
Go ahead and increase amlodipine to 10mg daily. This can make his ankles and feet swell a little, let me know if that becomes bothersome.  I'd like him to check his blood pressure twice a day for 2 weeks, record (he often does it 3 times in each sitting b/

## 2018-05-14 NOTE — TELEPHONE ENCOUNTER
Spoke with the pt and advised of the instructions from Dr. Darren Eubanks- the pt v/u    We scheduled the nurse visit for 2 weeks  Future Appointments  Date Time Provider Bel Silva   5/30/2018 10:00 AM  [facility] [place] [place]   9/12/2018 1:00

## 2018-05-14 NOTE — TELEPHONE ENCOUNTER
Pt called, states his blood pressure has been going up and down and he would like to discuss his medication.   Please call pt at [phone]

## 2018-05-16 ENCOUNTER — NURSE ONLY (OUTPATIENT)
Dept: FAMILY MEDICINE CLINIC | Facility: CLINIC | Age: 83
End: 2018-05-16

## 2018-05-16 VITALS — SYSTOLIC BLOOD PRESSURE: 157 MMHG | HEART RATE: 84 BPM | DIASTOLIC BLOOD PRESSURE: 67 MMHG

## 2018-05-16 NOTE — PROGRESS NOTES
Pt is worried about his BP- he comes in today to have us check his home machine-  His BP was high this morning sys 180's  He is worried about this - I asked about chest pain, headache, palpitations, SOB and he denies all- he states that he just hears jhony

## 2018-05-21 ENCOUNTER — TELEPHONE (OUTPATIENT)
Dept: FAMILY MEDICINE CLINIC | Facility: CLINIC | Age: 83
End: 2018-05-21

## 2018-05-21 DIAGNOSIS — I10 ESSENTIAL HYPERTENSION: ICD-10-CM

## 2018-05-21 DIAGNOSIS — I10 ESSENTIAL HYPERTENSION: Primary | ICD-10-CM

## 2018-05-21 RX ORDER — SPIRONOLACTONE 50 MG/1
TABLET, FILM COATED ORAL
Qty: 30 TABLET | Refills: 1 | Status: SHIPPED | OUTPATIENT
Start: 2018-05-21 | End: 2018-08-08

## 2018-05-21 RX ORDER — SPIRONOLACTONE 50 MG/1
TABLET, FILM COATED ORAL
Qty: 81 TABLET | Refills: 1 | OUTPATIENT
Start: 2018-05-21

## 2018-05-21 RX ORDER — AMLODIPINE BESYLATE 5 MG/1
5 TABLET ORAL
Qty: 30 TABLET | Refills: 0 | COMMUNITY
Start: 2018-05-21 | End: 2018-07-27

## 2018-05-21 NOTE — TELEPHONE ENCOUNTER
Called patient back, he states flushing of the face started a few days ago, and his hands get red, no other issues he states they do not itch and are not painful. Last night redness lasted about 45 minutes.  He states he takes his medications in the am. Mary Jane Rome

## 2018-05-21 NOTE — TELEPHONE ENCOUNTER
Pt called, thinks he might be having a little allergic reaction to his blood pressure medication and would like to discuss.   Please call pt at [phone]

## 2018-05-21 NOTE — TELEPHONE ENCOUNTER
Yea, I don't think this dose of medication is right for him. The flushing and anxious feeling are uncommon (<5%) but certainly possible and since nothing else has changed I think he most likely is experiencing side effects at the higher dose.      We do nee

## 2018-05-30 ENCOUNTER — NURSE ONLY (OUTPATIENT)
Dept: FAMILY MEDICINE CLINIC | Facility: CLINIC | Age: 83
End: 2018-05-30

## 2018-05-30 VITALS — SYSTOLIC BLOOD PRESSURE: 138 MMHG | HEART RATE: 68 BPM | DIASTOLIC BLOOD PRESSURE: 60 MMHG

## 2018-05-30 DIAGNOSIS — I10 ESSENTIAL HYPERTENSION: ICD-10-CM

## 2018-05-30 PROCEDURE — 80048 BASIC METABOLIC PNL TOTAL CA: CPT | Performed by: FAMILY MEDICINE

## 2018-05-30 PROCEDURE — 36415 COLL VENOUS BLD VENIPUNCTURE: CPT | Performed by: FAMILY MEDICINE

## 2018-05-30 NOTE — PROGRESS NOTES
Pt here for BP check and BMP    BP is better the pt is taking 5mg amlodipine and 50mg of spironolactone    The swelling in his hands has improved since his last nurse visit    The pt gave me a list of his BP's over the last 10 days- put in Dr. Alise Rodriguez

## 2018-05-30 NOTE — PROGRESS NOTES
Wow, that's the best blood pressure we've seen in a long time, I love it. His past 4 days of readings at home as well, superb (sbp mostly 120s and 130s, dbp 50s-60s). HRs look good as well (60s).   If he's feeling okay on this current regimen let's keep i

## 2018-05-31 DIAGNOSIS — N28.9 RENAL INSUFFICIENCY: Primary | ICD-10-CM

## 2018-05-31 PROBLEM — N39.0 URINARY TRACT INFECTION WITHOUT HEMATURIA, SITE UNSPECIFIED: Status: RESOLVED | Noted: 2018-04-27 | Resolved: 2018-05-31

## 2018-05-31 PROBLEM — E86.0 DEHYDRATION: Status: RESOLVED | Noted: 2018-04-27 | Resolved: 2018-05-31

## 2018-05-31 PROBLEM — N17.9 ACUTE KIDNEY INJURY (HCC): Status: RESOLVED | Noted: 2018-04-27 | Resolved: 2018-05-31

## 2018-05-31 PROBLEM — N39.0 URINARY TRACT INFECTION WITHOUT HEMATURIA: Status: RESOLVED | Noted: 2018-04-27 | Resolved: 2018-05-31

## 2018-06-22 ENCOUNTER — TELEPHONE (OUTPATIENT)
Dept: FAMILY MEDICINE CLINIC | Facility: CLINIC | Age: 83
End: 2018-06-22

## 2018-06-22 ENCOUNTER — NURSE ONLY (OUTPATIENT)
Dept: FAMILY MEDICINE CLINIC | Facility: CLINIC | Age: 83
End: 2018-06-22

## 2018-06-22 DIAGNOSIS — N28.9 RENAL INSUFFICIENCY: ICD-10-CM

## 2018-06-22 PROCEDURE — 80048 BASIC METABOLIC PNL TOTAL CA: CPT | Performed by: FAMILY MEDICINE

## 2018-06-22 PROCEDURE — 36415 COLL VENOUS BLD VENIPUNCTURE: CPT | Performed by: FAMILY MEDICINE

## 2018-06-22 NOTE — TELEPHONE ENCOUNTER
Called the pt and advised of the recommendations from Dr. Josefa Cueto  He is agreeable and made nurse visit for today  Future Appointments  Date Time Provider Bel Silva   6/22/2018 3:00 PM [facility] NURSE Annabella Carr [facility] [place]   9/12/2018 1:00 PM Clau Fields

## 2018-06-22 NOTE — TELEPHONE ENCOUNTER
That would be an odd side effect for Spironolactone. However if he is still taking the Irbesartan the 2 together can raise potassium levels. Have him get the BMP done today to make sure potassium level is fine.  Thanks

## 2018-06-22 NOTE — TELEPHONE ENCOUNTER
Spoke with the pt and he states that he has been having some prickly feeling in his arms and face-    He is taking spironolactone, this is a new mediction for him. He wonders if this could be a side effect.  She states that he still had the prickly feeling

## 2018-06-22 NOTE — PROGRESS NOTES
Mint tubes drawn from right arm with butterfly needle x1 after 1 unsuccessful attempt in the left arm. Pt didier well.  Pt left the clinic in stable condition

## 2018-06-22 NOTE — TELEPHONE ENCOUNTER
Left message for patient to call back. Advised office hours and phone number. Need more info on symptoms.

## 2018-06-22 NOTE — TELEPHONE ENCOUNTER
Pt called, he would like to discuss a possible side effect of prickly face feeling and a medication he is taking.   Please call pt at [phone]

## 2018-06-23 ENCOUNTER — TELEPHONE (OUTPATIENT)
Dept: FAMILY MEDICINE CLINIC | Facility: CLINIC | Age: 83
End: 2018-06-23

## 2018-06-23 DIAGNOSIS — E87.5 HYPERKALEMIA: Primary | ICD-10-CM

## 2018-06-23 NOTE — TELEPHONE ENCOUNTER
Patient advised of results and Dr Hany aM recommendations. Verbalized undersatnding to all. Patient will call back to make nurse appt for BP check and BMP. Recall in Epic. Future orders placed.

## 2018-06-23 NOTE — TELEPHONE ENCOUNTER
----- Message from Vernon Borges DO sent at 6/23/2018  8:39 AM CDT -----  Pls let pt know that his kidney function is overall stable, slightly more sluggish than last month, but better than it has been in the past. His potassium is too high.  That is like

## 2018-06-26 ENCOUNTER — TELEPHONE (OUTPATIENT)
Dept: FAMILY MEDICINE CLINIC | Facility: CLINIC | Age: 83
End: 2018-06-26

## 2018-06-26 ENCOUNTER — HOSPITAL ENCOUNTER (EMERGENCY)
Age: 83
Discharge: HOME | End: 2018-06-27
Attending: EMERGENCY MEDICINE
Payer: MEDICARE

## 2018-06-26 VITALS
TEMPERATURE: 98 F | HEART RATE: 78 BPM | DIASTOLIC BLOOD PRESSURE: 53 MMHG | WEIGHT: 142 LBS | HEIGHT: 65 IN | SYSTOLIC BLOOD PRESSURE: 149 MMHG | BODY MASS INDEX: 23.66 KG/M2 | OXYGEN SATURATION: 97 % | RESPIRATION RATE: 18 BRPM

## 2018-06-26 DIAGNOSIS — M79.605 PAIN OF LEFT LOWER EXTREMITY: Primary | ICD-10-CM

## 2018-06-26 PROCEDURE — 36415 COLL VENOUS BLD VENIPUNCTURE: CPT

## 2018-06-26 PROCEDURE — 99284 EMERGENCY DEPT VISIT MOD MDM: CPT

## 2018-06-26 NOTE — TELEPHONE ENCOUNTER
Called the pt and he wants to schedule his nurse visit for recheck of BMP to see if his potassium has gone down    He asks me if I saw the mychart that he sent and I advised that I have and that since Dr. Matteo Ortega is out of the office I asked another physician

## 2018-06-27 ENCOUNTER — APPOINTMENT (OUTPATIENT)
Dept: ULTRASOUND IMAGING | Age: 83
End: 2018-06-27
Attending: EMERGENCY MEDICINE
Payer: MEDICARE

## 2018-06-27 ENCOUNTER — OFFICE VISIT (OUTPATIENT)
Dept: FAMILY MEDICINE CLINIC | Facility: CLINIC | Age: 83
End: 2018-06-27

## 2018-06-27 VITALS
BODY MASS INDEX: 24 KG/M2 | RESPIRATION RATE: 16 BRPM | HEART RATE: 76 BPM | DIASTOLIC BLOOD PRESSURE: 62 MMHG | TEMPERATURE: 98 F | SYSTOLIC BLOOD PRESSURE: 138 MMHG | WEIGHT: 145.63 LBS

## 2018-06-27 DIAGNOSIS — C67.2 MALIGNANT NEOPLASM OF LATERAL WALL OF URINARY BLADDER (HCC): Primary | ICD-10-CM

## 2018-06-27 DIAGNOSIS — Z86.718 HISTORY OF DVT (DEEP VEIN THROMBOSIS): ICD-10-CM

## 2018-06-27 DIAGNOSIS — M79.605 ANTERIOR LEG PAIN, LEFT: ICD-10-CM

## 2018-06-27 PROCEDURE — 93971 EXTREMITY STUDY: CPT | Performed by: EMERGENCY MEDICINE

## 2018-06-27 PROCEDURE — 85025 COMPLETE CBC W/AUTO DIFF WBC: CPT | Performed by: EMERGENCY MEDICINE

## 2018-06-27 PROCEDURE — 80053 COMPREHEN METABOLIC PANEL: CPT | Performed by: EMERGENCY MEDICINE

## 2018-06-27 PROCEDURE — 99213 OFFICE O/P EST LOW 20 MIN: CPT | Performed by: FAMILY MEDICINE

## 2018-06-27 NOTE — ED PROVIDER NOTES
Patient Seen in: [facility] [place]    History   Patient presents with:  Lower Extremity Injury (musculoskeletal)    Stated Complaint: PAIN TO UPPER LEFT THIGH THAT IS INTERMITTENT DENIES INJ HERE FOR EVAL STATES D*    HPI    This prostatectomy        Smoking status: Never Smoker                                                              Smokeless tobacco: Never Used                      Comment: Non-smoker  Alcohol use:  No                Review of Systems    Positive for stated c Abnormality         Status                     ---------                               -----------         ------                     CBC W/ DIFFERENTIAL[[id number]]          Abnormal            Final result                 Please view results for these jonathan

## 2018-06-27 NOTE — PROGRESS NOTES
Wolfgang Riggs is a 80year old male.   HPI:   Latricia Fabrizio is here for discussion of lefft leg pain and shooting pains in the left leg for about a month,  He was concerned that it was a blood clot, so he went to the [place] ER for evaluation and it was negati apply route daily.  Disp: 2 Each Rfl: 1      Past Medical History:   Diagnosis Date   • Cancer [facility]) [id number]    Bladder   • DVT (deep venous thrombosis) ([facility] 2/5/2013   • Essential hypertension    • Essential hypertension, benign 10/26/09   • History of of these issues and agrees to the plan. The patient is asked to return in 1 month for follow up.

## 2018-06-27 NOTE — ED INITIAL ASSESSMENT (HPI)
Pt to ed for eval of left upper leg intermittent pain pt denies pain at this time but states when the pain comes \"its about an 8 or 9 and its sharp and sometime my leg feels warn or hot\" pt reports no injury at this time.  Pt reports Dr cardenas tomorrow but

## 2018-06-30 ENCOUNTER — PATIENT OUTREACH (OUTPATIENT)
Dept: FAMILY MEDICINE CLINIC | Facility: CLINIC | Age: 83
End: 2018-06-30

## 2018-07-02 ENCOUNTER — TELEPHONE (OUTPATIENT)
Dept: FAMILY MEDICINE CLINIC | Facility: CLINIC | Age: 83
End: 2018-07-02

## 2018-07-02 NOTE — TELEPHONE ENCOUNTER
I don't need to repeat EKG unless he has any new cardiac symptoms (chest pain, sob, velazco, heart palps, schedule appointment with me).   I would do a BMP a few days before the procedure to check potassium again make sure looking okay

## 2018-07-02 NOTE — TELEPHONE ENCOUNTER
Pt needs pre-op for bladder surgery scheduled for 7-10. Dr. Antonio Barillas, [facility].  Pt had labs recently, and EKG. Pt advises that it will be up to [facility] if she wants to do labs or EKG again. Dr. Librado Cabrales require a urine culture.  When can pt be worked in this week or se

## 2018-07-02 NOTE — TELEPHONE ENCOUNTER
Future Appointments  Date Time Provider Bel Silva   7/7/2018 11:15 AM Inessa Marquez MD [facility] [place]   9/12/2018 1:00 PM Inessa Marquez MD [facility] [place]

## 2018-07-07 ENCOUNTER — OFFICE VISIT (OUTPATIENT)
Dept: FAMILY MEDICINE CLINIC | Facility: CLINIC | Age: 83
End: 2018-07-07

## 2018-07-07 VITALS
OXYGEN SATURATION: 97 % | HEART RATE: 68 BPM | SYSTOLIC BLOOD PRESSURE: 140 MMHG | RESPIRATION RATE: 16 BRPM | DIASTOLIC BLOOD PRESSURE: 68 MMHG | TEMPERATURE: 97 F | BODY MASS INDEX: 24.45 KG/M2 | WEIGHT: 145 LBS | HEIGHT: 64.5 IN

## 2018-07-07 DIAGNOSIS — Z86.19 HISTORY OF CLOSTRIDIUM DIFFICILE COLITIS: ICD-10-CM

## 2018-07-07 DIAGNOSIS — Z01.818 PRE-OP EVALUATION: ICD-10-CM

## 2018-07-07 DIAGNOSIS — K86.2 PANCREATIC CYST: ICD-10-CM

## 2018-07-07 DIAGNOSIS — N28.9 RENAL INSUFFICIENCY: ICD-10-CM

## 2018-07-07 DIAGNOSIS — E55.9 VITAMIN D DEFICIENCY: ICD-10-CM

## 2018-07-07 DIAGNOSIS — N40.1 BENIGN PROSTATIC HYPERPLASIA WITH URINARY FREQUENCY: ICD-10-CM

## 2018-07-07 DIAGNOSIS — N52.1 ERECTILE DYSFUNCTION DUE TO DISEASES CLASSIFIED ELSEWHERE: ICD-10-CM

## 2018-07-07 DIAGNOSIS — C67.2 MALIGNANT NEOPLASM OF LATERAL WALL OF URINARY BLADDER (HCC): Primary | ICD-10-CM

## 2018-07-07 DIAGNOSIS — R20.2 PARESTHESIA: ICD-10-CM

## 2018-07-07 DIAGNOSIS — Z86.718 HISTORY OF DVT (DEEP VEIN THROMBOSIS): ICD-10-CM

## 2018-07-07 DIAGNOSIS — N32.0 BLADDER NECK OBSTRUCTION: ICD-10-CM

## 2018-07-07 DIAGNOSIS — R35.0 BENIGN PROSTATIC HYPERPLASIA WITH URINARY FREQUENCY: ICD-10-CM

## 2018-07-07 DIAGNOSIS — E87.5 HYPERKALEMIA: ICD-10-CM

## 2018-07-07 DIAGNOSIS — I10 ESSENTIAL HYPERTENSION: ICD-10-CM

## 2018-07-07 PROBLEM — G47.33 OSA (OBSTRUCTIVE SLEEP APNEA): Status: ACTIVE | Noted: 2018-07-02

## 2018-07-07 LAB
25-HYDROXYVITAMIN D (TOTAL): 45.5 NG/ML (ref 30–100)
ALBUMIN SERPL-MCNC: 4.1 G/DL (ref 3.5–4.8)
ALP LIVER SERPL-CCNC: 53 U/L (ref 45–117)
ALT SERPL-CCNC: 37 U/L (ref 17–63)
AST SERPL-CCNC: 27 U/L (ref 15–41)
BASOPHILS # BLD AUTO: 0.02 X10(3) UL (ref 0–0.1)
BASOPHILS NFR BLD AUTO: 0.5 %
BILIRUB SERPL-MCNC: 0.8 MG/DL (ref 0.1–2)
BUN BLD-MCNC: 38 MG/DL (ref 8–20)
CALCIUM BLD-MCNC: 9.4 MG/DL (ref 8.3–10.3)
CHLORIDE: 108 MMOL/L (ref 101–111)
CO2: 24 MMOL/L (ref 22–32)
CREAT BLD-MCNC: 1.69 MG/DL (ref 0.7–1.3)
EOSINOPHIL # BLD AUTO: 0.11 X10(3) UL (ref 0–0.3)
EOSINOPHIL NFR BLD AUTO: 2.6 %
ERYTHROCYTE [DISTWIDTH] IN BLOOD BY AUTOMATED COUNT: 13.8 % (ref 11.5–16)
FOLATE (FOLIC ACID), SERUM: 31.2 NG/ML (ref 8.7–24)
GLUCOSE BLD-MCNC: 227 MG/DL (ref 70–99)
HAV AB SERPL IA-ACNC: 503 PG/ML (ref 193–986)
HCT VFR BLD AUTO: 42.5 % (ref 37–53)
HGB BLD-MCNC: 13.9 G/DL (ref 13–17)
IMMATURE GRANULOCYTE COUNT: 0.03 X10(3) UL (ref 0–1)
IMMATURE GRANULOCYTE RATIO %: 0.7 %
LYMPHOCYTES # BLD AUTO: 0.9 X10(3) UL (ref 0.9–4)
LYMPHOCYTES NFR BLD AUTO: 21.5 %
M PROTEIN MFR SERPL ELPH: 7.7 G/DL (ref 6.1–8.3)
MCH RBC QN AUTO: 30.2 PG (ref 27–33.2)
MCHC RBC AUTO-ENTMCNC: 32.7 G/DL (ref 31–37)
MCV RBC AUTO: 92.4 FL (ref 80–99)
MONOCYTES # BLD AUTO: 0.26 X10(3) UL (ref 0.1–1)
MONOCYTES NFR BLD AUTO: 6.2 %
NEUTROPHIL ABS PRELIM: 2.87 X10 (3) UL (ref 1.3–6.7)
NEUTROPHILS # BLD AUTO: 2.87 X10(3) UL (ref 1.3–6.7)
NEUTROPHILS NFR BLD AUTO: 68.5 %
PLATELET # BLD AUTO: 227 10(3)UL (ref 150–450)
POTASSIUM SERPL-SCNC: 4.2 MMOL/L (ref 3.6–5.1)
RBC # BLD AUTO: 4.6 X10(6)UL (ref 3.8–5.8)
RED CELL DISTRIBUTION WIDTH-SD: 46.9 FL (ref 35.1–46.3)
SODIUM SERPL-SCNC: 140 MMOL/L (ref 136–144)
TSI SER-ACNC: 2.98 MIU/ML (ref 0.35–5.5)
WBC # BLD AUTO: 4.2 X10(3) UL (ref 4–13)

## 2018-07-07 PROCEDURE — 82746 ASSAY OF FOLIC ACID SERUM: CPT | Performed by: FAMILY MEDICINE

## 2018-07-07 PROCEDURE — 82607 VITAMIN B-12: CPT | Performed by: FAMILY MEDICINE

## 2018-07-07 PROCEDURE — 85025 COMPLETE CBC W/AUTO DIFF WBC: CPT | Performed by: FAMILY MEDICINE

## 2018-07-07 PROCEDURE — 99215 OFFICE O/P EST HI 40 MIN: CPT | Performed by: FAMILY MEDICINE

## 2018-07-07 PROCEDURE — 36415 COLL VENOUS BLD VENIPUNCTURE: CPT | Performed by: FAMILY MEDICINE

## 2018-07-07 PROCEDURE — 87086 URINE CULTURE/COLONY COUNT: CPT | Performed by: FAMILY MEDICINE

## 2018-07-07 PROCEDURE — 80053 COMPREHEN METABOLIC PANEL: CPT | Performed by: FAMILY MEDICINE

## 2018-07-07 PROCEDURE — 82306 VITAMIN D 25 HYDROXY: CPT | Performed by: FAMILY MEDICINE

## 2018-07-07 PROCEDURE — 84443 ASSAY THYROID STIM HORMONE: CPT | Performed by: FAMILY MEDICINE

## 2018-07-07 NOTE — PROGRESS NOTES
Patient presents today for labs. 1 gold, 2 light green, 1 lavender drawn from right ac with straight needle. Urine collected in grey tube. Patient left office in stable condition.

## 2018-07-07 NOTE — H&P
Fabi Guerrero is a 80year old male who presents for a pre-operative physical exam. Patient is to have URS and TURBT, to be done by Dr. Cristin Song at [facility] on 7/19/18. HPI:   Pt complains of nothing major physically.   He shares with me he's been feeli Rfl: 0   aspirin EC 81 MG Oral Tab EC Take 1 tablet (81 mg total) by mouth daily. (Patient taking differently: Take 325 mg by mouth nightly.  ) Disp: 30 tablet Rfl: 11   Cholecalciferol (VITAMIN D) 2000 UNITS Oral Cap Take 1 capsule by mouth daily.    Disp: denies abdominal pain,denies heartburn  : denies dysuria, no changes in stream  MUSCULOSKELETAL: denies new or unusual back pr joint pain or swelling  NEURO: denies new or unusual headaches  HEMATOLOGIC: denies hx of unusual bruising or bleeding      EXA This visit is primarily for counseling, spent 40 min with pt >50% of time in counseling and review of above

## 2018-07-11 ENCOUNTER — TELEPHONE (OUTPATIENT)
Dept: FAMILY MEDICINE CLINIC | Facility: CLINIC | Age: 83
End: 2018-07-11

## 2018-07-11 PROBLEM — R91.8 LUNG NODULES: Status: ACTIVE | Noted: 2018-07-11

## 2018-07-11 NOTE — TELEPHONE ENCOUNTER
Had nonspecific inflammatory changes in the upper collecting system that just needs to be looked at more closely, so really just gathering more information.

## 2018-07-11 NOTE — TELEPHONE ENCOUNTER
Angelique Padilla in [facility] needs Pre-op px, Urine results  Fax # [phone]

## 2018-07-11 NOTE — TELEPHONE ENCOUNTER
Called Dr. Darren Verde office and requested that Dr. Dinesh Almanza would like to speak with either Dr. Dot Dyson or his nurse about this pt.     The office is going to page Dr. Dot Dyson to call back

## 2018-07-19 ENCOUNTER — MED REC SCAN ONLY (OUTPATIENT)
Dept: FAMILY MEDICINE CLINIC | Facility: CLINIC | Age: 83
End: 2018-07-19

## 2018-07-25 RX ORDER — LOVASTATIN 10 MG/1
TABLET ORAL
Qty: 90 TABLET | Refills: 3 | Status: SHIPPED | OUTPATIENT
Start: 2018-07-25 | End: 2019-07-19

## 2018-07-27 DIAGNOSIS — I10 ESSENTIAL HYPERTENSION: ICD-10-CM

## 2018-07-27 NOTE — TELEPHONE ENCOUNTER
Amlodipine Last refilled on 5/21/18 for # 30 with 0 refills  Irbesartan refilled 4/30/18 #90 with 0 refills   Last seen on 7/7/18, /68  Future Appointments  Date Time Provider Bel Silva   9/12/2018 1:00 PM Newman Aase, MD [facility] [place]

## 2018-07-30 RX ORDER — AMLODIPINE BESYLATE 5 MG/1
TABLET ORAL
Qty: 90 TABLET | Refills: 0 | Status: SHIPPED | OUTPATIENT
Start: 2018-07-30 | End: 2018-10-22

## 2018-07-30 RX ORDER — IRBESARTAN 300 MG/1
TABLET ORAL
Qty: 90 TABLET | Refills: 0 | Status: SHIPPED | OUTPATIENT
Start: 2018-07-30 | End: 2018-10-31

## 2018-08-08 DIAGNOSIS — I10 ESSENTIAL HYPERTENSION: ICD-10-CM

## 2018-08-08 RX ORDER — TAMSULOSIN HYDROCHLORIDE 0.4 MG/1
CAPSULE ORAL
Qty: 180 CAPSULE | Refills: 3 | Status: SHIPPED | OUTPATIENT
Start: 2018-08-08 | End: 2019-08-18

## 2018-08-08 RX ORDER — SPIRONOLACTONE 50 MG/1
50 TABLET, FILM COATED ORAL DAILY
Qty: 90 TABLET | Refills: 3 | Status: SHIPPED | OUTPATIENT
Start: 2018-08-08 | End: 2019-04-03 | Stop reason: ALTCHOICE

## 2018-08-08 NOTE — TELEPHONE ENCOUNTER
Last refill on Tamsulosin #180 with 3 refills on 8 5 2017  Last refill on Spironolactone #30 with 1 refill on 5 21 2018   Last OV on 7 7 2018

## 2018-09-17 ENCOUNTER — OFFICE VISIT (OUTPATIENT)
Dept: FAMILY MEDICINE CLINIC | Facility: CLINIC | Age: 83
End: 2018-09-17
Payer: MEDICARE

## 2018-09-17 VITALS
HEART RATE: 80 BPM | TEMPERATURE: 98 F | BODY MASS INDEX: 25 KG/M2 | WEIGHT: 146 LBS | RESPIRATION RATE: 16 BRPM | DIASTOLIC BLOOD PRESSURE: 62 MMHG | SYSTOLIC BLOOD PRESSURE: 130 MMHG

## 2018-09-17 DIAGNOSIS — R20.2 LEG PARESTHESIA: ICD-10-CM

## 2018-09-17 DIAGNOSIS — C67.2 MALIGNANT NEOPLASM OF LATERAL WALL OF URINARY BLADDER (HCC): ICD-10-CM

## 2018-09-17 DIAGNOSIS — I10 ESSENTIAL HYPERTENSION: Primary | ICD-10-CM

## 2018-09-17 DIAGNOSIS — Z86.718 HISTORY OF DVT (DEEP VEIN THROMBOSIS): ICD-10-CM

## 2018-09-17 PROCEDURE — 99214 OFFICE O/P EST MOD 30 MIN: CPT | Performed by: FAMILY MEDICINE

## 2018-09-17 NOTE — PROGRESS NOTES
HPI:    Patient ID: Jaja Sparrow is a 80year old male. Patient has been botheres by localized upper left leg burining, mid front and slightly to side of thigh. No injury. No rash. No swelling or bruising. He was worried it could be blood clot.   No D) 2000 UNITS Oral Cap Take 1 capsule by mouth daily. Disp:  Rfl:    Elastic Bandages & Supports (MEDICAL COMPRESSION STOCKINGS) Does not apply Misc 1 Units by Does not apply route daily.  Disp: 2 Each Rfl: 1     Allergies:No Known Allergies   PHYSICAL EX Referrals:  None         ID#[id number]

## 2018-10-03 RX ORDER — AZELASTINE HCL 205.5 UG/1
SPRAY NASAL
Qty: 30 ML | Refills: 6 | Status: SHIPPED | OUTPATIENT
Start: 2018-10-03 | End: 2020-01-01

## 2018-10-03 RX ORDER — LORAZEPAM 0.5 MG/1
TABLET ORAL
Qty: 90 TABLET | Refills: 0 | Status: SHIPPED
Start: 2018-10-03 | End: 2019-09-09

## 2018-10-03 NOTE — TELEPHONE ENCOUNTER
Lorazepam last refilled on 10/27/17 for # 90 with 0 refills  Azelastine refilled 9/11/17 #30mL with 6 refills  Last seen on 9/17/18  No future appointments. Thank you.

## 2018-10-04 ENCOUNTER — TELEPHONE (OUTPATIENT)
Dept: FAMILY MEDICINE CLINIC | Facility: CLINIC | Age: 83
End: 2018-10-04

## 2018-10-04 NOTE — TELEPHONE ENCOUNTER
Call to patient. Has 15-20 tablets left. Doesn't need any refills at this time. Ok to wait until tomorrow morning.

## 2018-10-04 NOTE — TELEPHONE ENCOUNTER
Received fax from [facility] in [place] advising that lorazepam 0.5mg is backordered. Verbal from Dr Balbir Elena to change to 1.0mg and have pharmacy cut tablets in half for patient. Call to [facility] and spoke to Nahid hardwick.  Advised that they do not cut tablets

## 2018-10-05 NOTE — TELEPHONE ENCOUNTER
Called over to [facility] and spoke to Kev she states they do not know when they will have the Lorazapam 0.5 back in stock. Per message below patient has 15- 20 tablets left. Will hold message and check with pharmacy next week.

## 2018-10-05 NOTE — TELEPHONE ENCOUNTER
When do they anticipate getting the tablets in? Sounds like he has a few days worth, maybe he can make it until they get the new stock in?    If that is not possible, He can get the full tabs as long as he is able to cut them on his own and understnads t

## 2018-10-22 DIAGNOSIS — I10 ESSENTIAL HYPERTENSION: ICD-10-CM

## 2018-10-23 RX ORDER — AMLODIPINE BESYLATE 5 MG/1
TABLET ORAL
Qty: 90 TABLET | Refills: 3 | Status: SHIPPED | OUTPATIENT
Start: 2018-10-23 | End: 2019-10-12

## 2018-10-31 RX ORDER — IRBESARTAN 300 MG/1
TABLET ORAL
Qty: 90 TABLET | Refills: 3 | Status: SHIPPED | OUTPATIENT
Start: 2018-10-31 | End: 2019-04-05

## 2018-10-31 NOTE — TELEPHONE ENCOUNTER
Called the pharmacy and spoke with Sakshi Escobar and the med is back in stock and the pt picked up the script that was written on 10/3/18 October 12, 2018

## 2018-10-31 NOTE — TELEPHONE ENCOUNTER
Last refilled on 7/30/18 for # 90 with 0 refills  Last seen on 9/17/18, /62  Future Appointments   Date Time Provider Bel Silva   11/9/2018  8:15 AM Dottie Castillo MD [facility] [place]        Thank you.

## 2018-11-02 ENCOUNTER — TELEPHONE (OUTPATIENT)
Dept: FAMILY MEDICINE CLINIC | Facility: CLINIC | Age: 83
End: 2018-11-02

## 2018-11-02 NOTE — TELEPHONE ENCOUNTER
Patient's Irbesartan may be affected by a recall.   Patient should call pharmacy to determine if their Ibesartan tablets are included in this recall.     If the pharmacy can replace the recalled medicine with the same medication from either a different G

## 2018-11-09 ENCOUNTER — OFFICE VISIT (OUTPATIENT)
Dept: FAMILY MEDICINE CLINIC | Facility: CLINIC | Age: 83
End: 2018-11-09
Payer: MEDICARE

## 2018-11-09 VITALS
WEIGHT: 150 LBS | TEMPERATURE: 98 F | HEIGHT: 65 IN | OXYGEN SATURATION: 99 % | DIASTOLIC BLOOD PRESSURE: 68 MMHG | HEART RATE: 67 BPM | SYSTOLIC BLOOD PRESSURE: 140 MMHG | BODY MASS INDEX: 24.99 KG/M2

## 2018-11-09 DIAGNOSIS — N40.1 BENIGN PROSTATIC HYPERPLASIA WITH URINARY FREQUENCY: ICD-10-CM

## 2018-11-09 DIAGNOSIS — Z86.718 HISTORY OF DVT (DEEP VEIN THROMBOSIS): ICD-10-CM

## 2018-11-09 DIAGNOSIS — Z00.00 ENCOUNTER FOR ANNUAL HEALTH EXAMINATION: Primary | ICD-10-CM

## 2018-11-09 DIAGNOSIS — Z23 NEED FOR VACCINATION: ICD-10-CM

## 2018-11-09 DIAGNOSIS — R97.20 ELEVATED PROSTATE SPECIFIC ANTIGEN (PSA): ICD-10-CM

## 2018-11-09 DIAGNOSIS — R91.8 LUNG NODULES: ICD-10-CM

## 2018-11-09 DIAGNOSIS — G47.33 OSA (OBSTRUCTIVE SLEEP APNEA): ICD-10-CM

## 2018-11-09 DIAGNOSIS — C67.2 MALIGNANT NEOPLASM OF LATERAL WALL OF URINARY BLADDER (HCC): ICD-10-CM

## 2018-11-09 DIAGNOSIS — R35.0 BENIGN PROSTATIC HYPERPLASIA WITH URINARY FREQUENCY: ICD-10-CM

## 2018-11-09 DIAGNOSIS — Z86.19 HISTORY OF CLOSTRIDIUM DIFFICILE COLITIS: ICD-10-CM

## 2018-11-09 DIAGNOSIS — N52.1 ERECTILE DYSFUNCTION DUE TO DISEASES CLASSIFIED ELSEWHERE: ICD-10-CM

## 2018-11-09 DIAGNOSIS — N28.9 RENAL INSUFFICIENCY: ICD-10-CM

## 2018-11-09 DIAGNOSIS — Z13.31 DEPRESSION SCREENING: ICD-10-CM

## 2018-11-09 DIAGNOSIS — N32.0 BLADDER NECK OBSTRUCTION: ICD-10-CM

## 2018-11-09 DIAGNOSIS — K86.2 PANCREATIC CYST: ICD-10-CM

## 2018-11-09 DIAGNOSIS — I10 ESSENTIAL HYPERTENSION: ICD-10-CM

## 2018-11-09 PROCEDURE — G0439 PPPS, SUBSEQ VISIT: HCPCS | Performed by: FAMILY MEDICINE

## 2018-11-09 PROCEDURE — G0444 DEPRESSION SCREEN ANNUAL: HCPCS | Performed by: FAMILY MEDICINE

## 2018-11-09 NOTE — PROGRESS NOTES
HPI:   Leo Desai is a 80year old male who presents for a Medicare Subsequent Annual Wellness visit (Pt already had Initial Annual Wellness).     Continues to follow with Dr. Elsy Joy for the bladder cancer, seems to be doing well from this stand hyperplasia     Malignant neoplasm of lateral wall of urinary bladder (HCC)     Essential hypertension     Renal insufficiency     History of DVT (deep vein thrombosis)     History of Clostridium difficile colitis     SOHAN (obstructive sleep apnea)     Lung He  has a past medical history of [facility] ([facility] ([id number]), DVT (deep venous thrombosis) ([facility] (2/5/2013), Essential hypertension, Essential hypertension, benign (10/26/09), History of pulmonary embolism (2/5/2013), Impaired fasting glucose (7/15/10), Ki lb.    Medicare Hearing Assessment  (Required for AWV/SWV)    Whispered Voice          Visual Acuity  Right Eye Visual Acuity: Corrected Right Eye Chart Acuity: 20/25   Left Eye Visual Acuity: Corrected Left Eye Chart Acuity: 20/25   Both Eyes Visual Acuit who presents for a Medicare Assessment.      PLAN SUMMARY:   Diagnoses and all orders for this visit:    Encounter for annual health examination  -overall feeling well px and mentally, no new concernes identified today, patient comfortable with plan for his Family(\"work\")    This section provided for quick review of chart, separate sheet to patient  [address] Internal Lab or Procedure External Lab or Procedure   Diabetes Screening      HbgA1C   Annually HGBA1C (%)   Levi concentrates   Clients of institutions for the mentally retarded   Persons who live in the same house as a HepB virus carrier   Homosexual men   Illicit injectable drug abusers     Tetanus Toxoid  Only covered with a cut with metal- TD and TDaP Not covered

## 2019-01-01 ENCOUNTER — TELEPHONE (OUTPATIENT)
Dept: FAMILY MEDICINE CLINIC | Facility: CLINIC | Age: 84
End: 2019-01-01

## 2019-01-01 ENCOUNTER — NURSE ONLY (OUTPATIENT)
Dept: FAMILY MEDICINE CLINIC | Facility: CLINIC | Age: 84
End: 2019-01-01
Payer: MEDICARE

## 2019-01-01 DIAGNOSIS — N28.9 RENAL INSUFFICIENCY: Primary | ICD-10-CM

## 2019-01-01 DIAGNOSIS — I10 ESSENTIAL HYPERTENSION: ICD-10-CM

## 2019-01-01 DIAGNOSIS — IMO0002 SOLITARY LEFT KIDNEY: ICD-10-CM

## 2019-01-01 PROCEDURE — 85025 COMPLETE CBC W/AUTO DIFF WBC: CPT | Performed by: FAMILY MEDICINE

## 2019-01-01 PROCEDURE — 80048 BASIC METABOLIC PNL TOTAL CA: CPT | Performed by: FAMILY MEDICINE

## 2019-01-01 RX ORDER — AMLODIPINE BESYLATE 5 MG/1
TABLET ORAL
Qty: 90 TABLET | Refills: 0 | Status: SHIPPED | OUTPATIENT
Start: 2019-01-01 | End: 2020-01-01

## 2019-01-17 ENCOUNTER — TELEPHONE (OUTPATIENT)
Dept: FAMILY MEDICINE CLINIC | Facility: CLINIC | Age: 84
End: 2019-01-17

## 2019-01-17 NOTE — TELEPHONE ENCOUNTER
Noted. Order in blue book    Future Appointments   Date Time Provider Bel Silva   1/29/2019 11:15 AM Ambrose Danielle MD [facility] [place]

## 2019-01-17 NOTE — TELEPHONE ENCOUNTER
FYI:    PT COMING IN FOR PRE-OP APT ON 1/29/18.   PT HAVING SURGERY ON 2/4/19    ORDERS IN BLUE BOOK    THANK YOU

## 2019-01-29 ENCOUNTER — OFFICE VISIT (OUTPATIENT)
Dept: FAMILY MEDICINE CLINIC | Facility: CLINIC | Age: 84
End: 2019-01-29
Payer: MEDICARE

## 2019-01-29 VITALS
RESPIRATION RATE: 18 BRPM | DIASTOLIC BLOOD PRESSURE: 70 MMHG | WEIGHT: 147.38 LBS | SYSTOLIC BLOOD PRESSURE: 160 MMHG | BODY MASS INDEX: 25.79 KG/M2 | TEMPERATURE: 98 F | HEIGHT: 63.5 IN | HEART RATE: 102 BPM

## 2019-01-29 DIAGNOSIS — D49.4 BLADDER NEOPLASM: ICD-10-CM

## 2019-01-29 DIAGNOSIS — R93.5 ABNORMAL CT SCAN, PELVIS: ICD-10-CM

## 2019-01-29 DIAGNOSIS — Z86.718 HISTORY OF DVT (DEEP VEIN THROMBOSIS): ICD-10-CM

## 2019-01-29 DIAGNOSIS — Z01.818 PRE-OP EVALUATION: Primary | ICD-10-CM

## 2019-01-29 DIAGNOSIS — R82.90 ABNORMAL FINDING IN URINE: ICD-10-CM

## 2019-01-29 DIAGNOSIS — Z86.19 HISTORY OF CLOSTRIDIUM DIFFICILE COLITIS: ICD-10-CM

## 2019-01-29 DIAGNOSIS — I10 ESSENTIAL HYPERTENSION: ICD-10-CM

## 2019-01-29 DIAGNOSIS — R10.9 ABDOMINAL PAIN, UNSPECIFIED ABDOMINAL LOCATION: ICD-10-CM

## 2019-01-29 LAB
ALBUMIN SERPL-MCNC: 4.5 G/DL (ref 3.1–4.5)
ALBUMIN/GLOB SERPL: 1.2 {RATIO} (ref 1–2)
ALP LIVER SERPL-CCNC: 69 U/L (ref 45–117)
ALT SERPL-CCNC: 38 U/L (ref 17–63)
ANION GAP SERPL CALC-SCNC: 8 MMOL/L (ref 0–18)
AST SERPL-CCNC: 30 U/L (ref 15–41)
BASOPHILS # BLD AUTO: 0.03 X10(3) UL (ref 0–0.2)
BASOPHILS NFR BLD AUTO: 0.6 %
BILIRUB SERPL-MCNC: 0.6 MG/DL (ref 0.1–2)
BILIRUB UR QL STRIP.AUTO: NEGATIVE
BUN BLD-MCNC: 52 MG/DL (ref 8–20)
BUN/CREAT SERPL: 28.4 (ref 10–20)
CALCIUM BLD-MCNC: 9.4 MG/DL (ref 8.3–10.3)
CHLORIDE SERPL-SCNC: 109 MMOL/L (ref 101–111)
CO2 SERPL-SCNC: 22 MMOL/L (ref 22–32)
COLOR UR AUTO: YELLOW
CREAT BLD-MCNC: 1.83 MG/DL (ref 0.7–1.3)
DEPRECATED RDW RBC AUTO: 45.5 FL (ref 35.1–46.3)
EOSINOPHIL # BLD AUTO: 0.13 X10(3) UL (ref 0–0.7)
EOSINOPHIL NFR BLD AUTO: 2.8 %
ERYTHROCYTE [DISTWIDTH] IN BLOOD BY AUTOMATED COUNT: 13.2 % (ref 11–15)
GLOBULIN PLAS-MCNC: 3.9 G/DL (ref 2.8–4.4)
GLUCOSE BLD-MCNC: 209 MG/DL (ref 70–99)
GLUCOSE UR STRIP.AUTO-MCNC: 50 MG/DL
HCT VFR BLD AUTO: 42.7 % (ref 39–53)
HGB BLD-MCNC: 14 G/DL (ref 13–17.5)
IMM GRANULOCYTES # BLD AUTO: 0.03 X10(3) UL (ref 0–1)
IMM GRANULOCYTES NFR BLD: 0.6 %
INR BLD: 0.94 (ref 0.9–1.1)
KETONES UR STRIP.AUTO-MCNC: NEGATIVE MG/DL
LYMPHOCYTES # BLD AUTO: 0.8 X10(3) UL (ref 1–4)
LYMPHOCYTES NFR BLD AUTO: 17.1 %
M PROTEIN MFR SERPL ELPH: 8.4 G/DL (ref 6.4–8.2)
MCH RBC QN AUTO: 30.9 PG (ref 26–34)
MCHC RBC AUTO-ENTMCNC: 32.8 G/DL (ref 31–37)
MCV RBC AUTO: 94.3 FL (ref 80–100)
MONOCYTES # BLD AUTO: 0.4 X10(3) UL (ref 0.1–1)
MONOCYTES NFR BLD AUTO: 8.5 %
NEUTROPHILS # BLD AUTO: 3.29 X10 (3) UL (ref 1.5–7.7)
NEUTROPHILS # BLD AUTO: 3.29 X10(3) UL (ref 1.5–7.7)
NEUTROPHILS NFR BLD AUTO: 70.4 %
NITRITE UR QL STRIP.AUTO: NEGATIVE
OSMOLALITY SERPL CALC.SUM OF ELEC: 308 MOSM/KG (ref 275–295)
PH UR STRIP.AUTO: 5 [PH] (ref 4.5–8)
PLATELET # BLD AUTO: 315 10(3)UL (ref 150–450)
POTASSIUM SERPL-SCNC: 4.9 MMOL/L (ref 3.6–5.1)
PROT UR STRIP.AUTO-MCNC: 30 MG/DL
PSA SERPL DL<=0.01 NG/ML-MCNC: 13 SECONDS (ref 12.4–14.7)
RBC # BLD AUTO: 4.53 X10(6)UL (ref 3.8–5.8)
RBC #/AREA URNS AUTO: >10 /HPF
SODIUM SERPL-SCNC: 139 MMOL/L (ref 136–144)
SP GR UR STRIP.AUTO: 1.01 (ref 1–1.03)
UROBILINOGEN UR STRIP.AUTO-MCNC: <2 MG/DL
WBC # BLD AUTO: 4.7 X10(3) UL (ref 4–11)
WBC #/AREA URNS AUTO: >50 /HPF

## 2019-01-29 PROCEDURE — 36415 COLL VENOUS BLD VENIPUNCTURE: CPT | Performed by: FAMILY MEDICINE

## 2019-01-29 PROCEDURE — 85610 PROTHROMBIN TIME: CPT | Performed by: FAMILY MEDICINE

## 2019-01-29 PROCEDURE — 80053 COMPREHEN METABOLIC PANEL: CPT | Performed by: FAMILY MEDICINE

## 2019-01-29 PROCEDURE — 99214 OFFICE O/P EST MOD 30 MIN: CPT | Performed by: FAMILY MEDICINE

## 2019-01-29 PROCEDURE — 81001 URINALYSIS AUTO W/SCOPE: CPT | Performed by: FAMILY MEDICINE

## 2019-01-29 PROCEDURE — 85730 THROMBOPLASTIN TIME PARTIAL: CPT | Performed by: FAMILY MEDICINE

## 2019-01-29 PROCEDURE — 85025 COMPLETE CBC W/AUTO DIFF WBC: CPT | Performed by: FAMILY MEDICINE

## 2019-01-29 PROCEDURE — 87086 URINE CULTURE/COLONY COUNT: CPT | Performed by: FAMILY MEDICINE

## 2019-01-29 PROCEDURE — 93000 ELECTROCARDIOGRAM COMPLETE: CPT | Performed by: FAMILY MEDICINE

## 2019-01-30 LAB — APTT PPP: 33.7 SECONDS (ref 26.1–34.6)

## 2019-01-31 ENCOUNTER — TELEPHONE (OUTPATIENT)
Dept: FAMILY MEDICINE CLINIC | Facility: CLINIC | Age: 84
End: 2019-01-31

## 2019-01-31 NOTE — TELEPHONE ENCOUNTER
Nicole Sims from Dr. Matilde Decker office called regarding the procedure pt is having. She will fax over information. Any questions after we receive the information, please call Nicole Sims at [phone].

## 2019-02-01 ENCOUNTER — TELEPHONE (OUTPATIENT)
Dept: FAMILY MEDICINE CLINIC | Facility: CLINIC | Age: 84
End: 2019-02-01

## 2019-02-01 ENCOUNTER — MED REC SCAN ONLY (OUTPATIENT)
Dept: FAMILY MEDICINE CLINIC | Facility: CLINIC | Age: 84
End: 2019-02-01

## 2019-02-01 NOTE — TELEPHONE ENCOUNTER
Pt would like to know if [address] is going to put an a script for a inhaler. Pt said it was discussed at his last appt.    Please return call to [phone]

## 2019-02-01 NOTE — TELEPHONE ENCOUNTER
Efrain Cobb from [facility] called, the pre op form they received from us yesterday via fax does not appear to be completed. Please complete form and fax to [phone]. Any questions please call Efrain Cobb at [phone].    Pt is scheduled for Cystoscopy on

## 2019-02-01 NOTE — TELEPHONE ENCOUNTER
Spoke with the pt and advised that it was sent, but wanted to confirm the pharmacy he states that it is supposed to go to [facility] that it went to some pharmacy in [place]    Resent the script to the correct pharmacy    I called the pharmacy i

## 2019-02-01 NOTE — TELEPHONE ENCOUNTER
The H&P that was faxed over yesterday is not complete- let me know when it is complete and I will fax it over

## 2019-02-07 ENCOUNTER — APPOINTMENT (OUTPATIENT)
Dept: CT IMAGING | Age: 84
End: 2019-02-07
Attending: EMERGENCY MEDICINE
Payer: MEDICARE

## 2019-02-07 ENCOUNTER — HOSPITAL ENCOUNTER (EMERGENCY)
Age: 84
Discharge: HOME | End: 2019-02-08
Attending: EMERGENCY MEDICINE
Payer: MEDICARE

## 2019-02-07 VITALS
DIASTOLIC BLOOD PRESSURE: 63 MMHG | OXYGEN SATURATION: 96 % | RESPIRATION RATE: 16 BRPM | TEMPERATURE: 99 F | HEART RATE: 79 BPM | WEIGHT: 147 LBS | BODY MASS INDEX: 25.1 KG/M2 | SYSTOLIC BLOOD PRESSURE: 142 MMHG | HEIGHT: 64 IN

## 2019-02-07 DIAGNOSIS — R18.8 RETROPERITONEAL FLUID COLLECTION: ICD-10-CM

## 2019-02-07 DIAGNOSIS — R33.9 URINARY RETENTION: Primary | ICD-10-CM

## 2019-02-07 LAB
ANION GAP SERPL CALC-SCNC: 4 MMOL/L (ref 0–18)
BASOPHILS # BLD AUTO: 0.03 X10(3) UL (ref 0–0.2)
BASOPHILS NFR BLD AUTO: 0.3 %
BILIRUB UR QL STRIP.AUTO: NEGATIVE
BUN BLD-MCNC: 37 MG/DL (ref 8–20)
BUN/CREAT SERPL: 22.3 (ref 10–20)
CALCIUM BLD-MCNC: 8.5 MG/DL (ref 8.3–10.3)
CHLORIDE SERPL-SCNC: 107 MMOL/L (ref 101–111)
CLARITY UR REFRACT.AUTO: CLEAR
CO2 SERPL-SCNC: 23 MMOL/L (ref 22–32)
COLOR UR AUTO: YELLOW
CREAT BLD-MCNC: 1.66 MG/DL (ref 0.7–1.3)
DEPRECATED RDW RBC AUTO: 45.4 FL (ref 35.1–46.3)
EOSINOPHIL # BLD AUTO: 0.21 X10(3) UL (ref 0–0.7)
EOSINOPHIL NFR BLD AUTO: 2.4 %
ERYTHROCYTE [DISTWIDTH] IN BLOOD BY AUTOMATED COUNT: 13.3 % (ref 11–15)
GLUCOSE BLD-MCNC: 143 MG/DL (ref 70–99)
GLUCOSE UR STRIP.AUTO-MCNC: NEGATIVE MG/DL
HCT VFR BLD AUTO: 35 % (ref 39–53)
HGB BLD-MCNC: 11.7 G/DL (ref 13–17.5)
IMM GRANULOCYTES # BLD AUTO: 0.04 X10(3) UL (ref 0–1)
IMM GRANULOCYTES NFR BLD: 0.5 %
KETONES UR STRIP.AUTO-MCNC: NEGATIVE MG/DL
LYMPHOCYTES # BLD AUTO: 0.67 X10(3) UL (ref 1–4)
LYMPHOCYTES NFR BLD AUTO: 7.7 %
MCH RBC QN AUTO: 30.8 PG (ref 26–34)
MCHC RBC AUTO-ENTMCNC: 33.4 G/DL (ref 31–37)
MCV RBC AUTO: 92.1 FL (ref 80–100)
MONOCYTES # BLD AUTO: 0.85 X10(3) UL (ref 0.1–1)
MONOCYTES NFR BLD AUTO: 9.8 %
NEUTROPHILS # BLD AUTO: 6.89 X10 (3) UL (ref 1.5–7.7)
NEUTROPHILS # BLD AUTO: 6.89 X10(3) UL (ref 1.5–7.7)
NEUTROPHILS NFR BLD AUTO: 79.3 %
NITRITE UR QL STRIP.AUTO: NEGATIVE
OSMOLALITY SERPL CALC.SUM OF ELEC: 289 MOSM/KG (ref 275–295)
PH UR STRIP.AUTO: 5.5 [PH] (ref 4.5–8)
PLATELET # BLD AUTO: 235 10(3)UL (ref 150–450)
POTASSIUM SERPL-SCNC: 4.8 MMOL/L (ref 3.6–5.1)
RBC # BLD AUTO: 3.8 X10(6)UL (ref 3.8–5.8)
RBC #/AREA URNS AUTO: >10 /HPF
SODIUM SERPL-SCNC: 134 MMOL/L (ref 136–144)
SP GR UR STRIP.AUTO: <=1.005 (ref 1–1.03)
UROBILINOGEN UR STRIP.AUTO-MCNC: 0.2 MG/DL
WBC # BLD AUTO: 8.7 X10(3) UL (ref 4–11)

## 2019-02-07 PROCEDURE — 96375 TX/PRO/DX INJ NEW DRUG ADDON: CPT

## 2019-02-07 PROCEDURE — 81001 URINALYSIS AUTO W/SCOPE: CPT | Performed by: EMERGENCY MEDICINE

## 2019-02-07 PROCEDURE — 99284 EMERGENCY DEPT VISIT MOD MDM: CPT

## 2019-02-07 PROCEDURE — 96365 THER/PROPH/DIAG IV INF INIT: CPT

## 2019-02-07 PROCEDURE — 99285 EMERGENCY DEPT VISIT HI MDM: CPT

## 2019-02-07 PROCEDURE — 87086 URINE CULTURE/COLONY COUNT: CPT | Performed by: EMERGENCY MEDICINE

## 2019-02-07 PROCEDURE — 80048 BASIC METABOLIC PNL TOTAL CA: CPT | Performed by: EMERGENCY MEDICINE

## 2019-02-07 PROCEDURE — 74176 CT ABD & PELVIS W/O CONTRAST: CPT | Performed by: EMERGENCY MEDICINE

## 2019-02-07 PROCEDURE — 85025 COMPLETE CBC W/AUTO DIFF WBC: CPT | Performed by: EMERGENCY MEDICINE

## 2019-02-07 RX ORDER — MORPHINE SULFATE 4 MG/ML
4 INJECTION, SOLUTION INTRAMUSCULAR; INTRAVENOUS ONCE
Status: COMPLETED | OUTPATIENT
Start: 2019-02-07 | End: 2019-02-07

## 2019-02-07 RX ORDER — HYDROCODONE BITARTRATE AND ACETAMINOPHEN 5; 325 MG/1; MG/1
1 TABLET ORAL EVERY 6 HOURS PRN
Qty: 24 TABLET | Refills: 0 | Status: SHIPPED | OUTPATIENT
Start: 2019-02-07 | End: 2019-06-19

## 2019-02-07 RX ORDER — LIDOCAINE HYDROCHLORIDE 20 MG/ML
5 JELLY TOPICAL ONCE
Status: COMPLETED | OUTPATIENT
Start: 2019-02-07 | End: 2019-02-07

## 2019-02-07 RX ORDER — CEPHALEXIN 500 MG/1
500 CAPSULE ORAL 3 TIMES DAILY
Qty: 21 CAPSULE | Refills: 0 | Status: SHIPPED | OUTPATIENT
Start: 2019-02-07 | End: 2019-02-14

## 2019-02-08 ENCOUNTER — TELEPHONE (OUTPATIENT)
Dept: FAMILY MEDICINE CLINIC | Facility: CLINIC | Age: 84
End: 2019-02-08

## 2019-02-08 NOTE — TELEPHONE ENCOUNTER
Spoke with the pt and he states that he had the bluelight procedure and they could not get the camera through the ureter into the bladder so he had another procedure to look at the bladder from the reverse    He had a cathter on Monday from the procedure-

## 2019-02-08 NOTE — ED INITIAL ASSESSMENT (HPI)
Pt has post op pain related to bladder biopsy on Monday at [facility]; Pt has hx of bladder cancer since 1994

## 2019-02-08 NOTE — TELEPHONE ENCOUNTER
Spoke with the pt and advised of Dr. Her Lung recommendations and gave him the phone number to [facility] urology but advised to make she he runs this by he urologist for their opinion.     He then asks me about the constipation- he states that he tried stool soften

## 2019-02-08 NOTE — TELEPHONE ENCOUNTER
I DEFINITELY want his uro's opinion/guidance on if/when to remove catheter and try again in relation to upcoming procedure, this is their arena of expertise

## 2019-02-08 NOTE — ED PROVIDER NOTES
Patient Seen in: [facility] [place]    History   Patient presents with:  Postop/Procedure Problem    Stated Complaint: post op pain    HPI    28-year-old male, history of bladder cancer, here for evaluation of lower abdominal pain. OTHER SURGICAL HISTORY      prostatectomy           Social History    Tobacco Use      Smoking status: Never Smoker      Smokeless tobacco: Never Used      Tobacco comment: Non-smoker    Alcohol use: No      Alcohol/week: 0.0 oz    Drug use: No      Review limits   URINALYSIS WITH CULTURE REFLEX - Abnormal; Notable for the following components:    Blood Urine Large (*)     Protein Urine Trace (*)     Leukocyte Esterase Urine Moderate (*)     All other components within normal limits   URINE MICROSCOPIC W REF 1. 6 cm. This is most likely related to a hematoma/seroma. The air bubbles are most likely related to recent instrumentation. 4.  Enlarging solid exophytic mass midpole left kidney most likely a renal cell carcinoma. This measures 2.1 x 1.9 cm.     Dictat tablet by mouth every 6 (six) hours as needed for Pain. I do not take this medication with plain Tylenol.   Qty: 24 tablet Refills: 0

## 2019-02-12 ENCOUNTER — TELEPHONE (OUTPATIENT)
Dept: FAMILY MEDICINE CLINIC | Facility: CLINIC | Age: 84
End: 2019-02-12

## 2019-02-12 DIAGNOSIS — Z86.19 HISTORY OF CLOSTRIDIUM DIFFICILE COLITIS: ICD-10-CM

## 2019-02-12 DIAGNOSIS — R19.7 DIARRHEA OF PRESUMED INFECTIOUS ORIGIN: Primary | ICD-10-CM

## 2019-02-12 NOTE — TELEPHONE ENCOUNTER
Yes, let's order c dif and hold abx until results back, but call if temp 100.4 or higher in the meantime (just check if feeling sicker)

## 2019-02-12 NOTE — TELEPHONE ENCOUNTER
Spoke with the pt and advised of the instructions and he v/u   He asks if we can fax the order to [address] [place] [address] in [facility]

## 2019-02-12 NOTE — TELEPHONE ENCOUNTER
Spoke with the pt and he states that he was in the ER last week, he was given an abx for a fever- for 7 days he has taken 3 days of it.      miralax was recommended for constipation last week his last dose was Friday he states that he only took 2 doses

## 2019-02-14 ENCOUNTER — TELEPHONE (OUTPATIENT)
Dept: FAMILY MEDICINE CLINIC | Facility: CLINIC | Age: 84
End: 2019-02-14

## 2019-02-14 NOTE — TELEPHONE ENCOUNTER
Pt called, he was wondering if we have the results of the Cdif test that he took a sample for to [facility] yesterday morning.   Please call pt at [phone]

## 2019-02-20 ENCOUNTER — TELEPHONE (OUTPATIENT)
Dept: FAMILY MEDICINE CLINIC | Facility: CLINIC | Age: 84
End: 2019-02-20

## 2019-02-20 DIAGNOSIS — Z86.19 HX OF CLOSTRIDIUM DIFFICILE INFECTION: ICD-10-CM

## 2019-02-20 DIAGNOSIS — R19.5 LOOSE STOOLS: Primary | ICD-10-CM

## 2019-02-20 NOTE — TELEPHONE ENCOUNTER
----- Message from Patricia Joyce MD sent at 2/19/2019 10:03 PM CST -----  Regarding: f/u  Can you please give Harv a call today--  He messaged me about his bladder and stools. I messaged him back but want further follow-up with him.       The C dif test cou

## 2019-02-20 NOTE — TELEPHONE ENCOUNTER
Spoke with the pt and advised of the notes from Dr. Sherlyn Sanchez  And he verbalized understanding     Advised of the order for the cdif he states that he would like to take the order himself so he can talk with the [facility] people to see about the the test and if it

## 2019-02-28 ENCOUNTER — MED REC SCAN ONLY (OUTPATIENT)
Dept: FAMILY MEDICINE CLINIC | Facility: CLINIC | Age: 84
End: 2019-02-28

## 2019-03-02 ENCOUNTER — HOSPITAL ENCOUNTER (EMERGENCY)
Facility: HOSPITAL | Age: 84
Discharge: HOME | End: 2019-03-02
Attending: EMERGENCY MEDICINE
Payer: MEDICARE

## 2019-03-02 VITALS
RESPIRATION RATE: 16 BRPM | OXYGEN SATURATION: 97 % | TEMPERATURE: 99 F | DIASTOLIC BLOOD PRESSURE: 66 MMHG | SYSTOLIC BLOOD PRESSURE: 132 MMHG | BODY MASS INDEX: 24.16 KG/M2 | HEIGHT: 65 IN | WEIGHT: 145 LBS | HEART RATE: 76 BPM

## 2019-03-02 DIAGNOSIS — N39.0 URINARY TRACT INFECTION WITH HEMATURIA, SITE UNSPECIFIED: Primary | ICD-10-CM

## 2019-03-02 DIAGNOSIS — R31.9 URINARY TRACT INFECTION WITH HEMATURIA, SITE UNSPECIFIED: Primary | ICD-10-CM

## 2019-03-02 DIAGNOSIS — G89.18 POST-OP PAIN: ICD-10-CM

## 2019-03-02 LAB
BILIRUB UR QL STRIP.AUTO: NEGATIVE
COLOR UR AUTO: YELLOW
GLUCOSE UR STRIP.AUTO-MCNC: NEGATIVE MG/DL
KETONES UR STRIP.AUTO-MCNC: NEGATIVE MG/DL
NITRITE UR QL STRIP.AUTO: NEGATIVE
PH UR STRIP.AUTO: 6 [PH] (ref 4.5–8)
PROT UR STRIP.AUTO-MCNC: 30 MG/DL
RBC #/AREA URNS AUTO: >10 /HPF
SP GR UR STRIP.AUTO: 1.01 (ref 1–1.03)
UROBILINOGEN UR STRIP.AUTO-MCNC: <2 MG/DL
WBC #/AREA URNS AUTO: >50 /HPF

## 2019-03-02 PROCEDURE — 99283 EMERGENCY DEPT VISIT LOW MDM: CPT

## 2019-03-02 PROCEDURE — 87086 URINE CULTURE/COLONY COUNT: CPT | Performed by: EMERGENCY MEDICINE

## 2019-03-02 PROCEDURE — 81001 URINALYSIS AUTO W/SCOPE: CPT | Performed by: EMERGENCY MEDICINE

## 2019-03-02 RX ORDER — CEPHALEXIN 500 MG/1
500 CAPSULE ORAL 4 TIMES DAILY
Qty: 40 CAPSULE | Refills: 0 | Status: SHIPPED | OUTPATIENT
Start: 2019-03-02 | End: 2019-03-12

## 2019-03-02 NOTE — ED INITIAL ASSESSMENT (HPI)
Patient complaining of dysuria and incomplete bladder emptying. Denies hematuria.  Patient states he had a ureteral stent placement yesterday; done at [facility].

## 2019-03-02 NOTE — ED PROVIDER NOTES
Patient Seen in: [facility] Emergency Department    History   Patient presents with:  Urinary Symptoms (urologic)    Stated Complaint: difficulty urinating, feels as though not emptying bladder fully, had urinary s*    HPI    27-year-old male who pr as though not emptying bladder fully, had urinary s*  Other systems are as noted in HPI. Constitutional and vital signs reviewed. All other systems reviewed and negative except as noted above.     Physical Exam     ED Triage Vitals [03/02/19 1633]   B etiology may be accounting for symptoms. His symptoms are likely related to the recent stent placement and bladder spasms as well as urethral and ureteral spasms. Told to continue with Tylenol for pain control.   The patient's urinalysis showed greater th

## 2019-03-03 NOTE — ED NOTES
Pt was advised to start abx tonight. Pt was concerned about his history of C diff. Pt was advised to finish the abx. Was advised to contact his PCP in couple of days for f/u.

## 2019-03-04 ENCOUNTER — TELEPHONE (OUTPATIENT)
Dept: FAMILY MEDICINE CLINIC | Facility: CLINIC | Age: 84
End: 2019-03-04

## 2019-03-04 NOTE — TELEPHONE ENCOUNTER
Patient advised of this. Confirmed no fevers/chills.    Future Appointments   Date Time Provider Bel Silva   3/8/2019  2:30 PM Carlos Wallace MD [facility] [place]

## 2019-03-04 NOTE — TELEPHONE ENCOUNTER
Patient states has a urinary infection. Had stent placed on Friday at [facility]. Went to [facility] ED on Saturday. Urine was checked at this time and was found to have UTI. Confirmed patient is taking Cephalexin 500 mg four times a day as directed.   Still having some p

## 2019-03-04 NOTE — TELEPHONE ENCOUNTER
I'm happy to see him in follow-up if he'd like, but if he's on appropriate abx, no fever/chills, I don't know that there'd be much to do. ..

## 2019-03-04 NOTE — TELEPHONE ENCOUNTER
Pt called, had a stint put in urethra this past Friday at [facility] in [facility] and has now developed a UTI. Pt told to call his PCP.    Please call pt at [phone]

## 2019-03-08 ENCOUNTER — OFFICE VISIT (OUTPATIENT)
Dept: FAMILY MEDICINE CLINIC | Facility: CLINIC | Age: 84
End: 2019-03-08
Payer: MEDICARE

## 2019-03-08 VITALS
HEIGHT: 63.5 IN | HEART RATE: 71 BPM | BODY MASS INDEX: 25.2 KG/M2 | TEMPERATURE: 98 F | WEIGHT: 144 LBS | DIASTOLIC BLOOD PRESSURE: 54 MMHG | SYSTOLIC BLOOD PRESSURE: 130 MMHG | OXYGEN SATURATION: 99 %

## 2019-03-08 DIAGNOSIS — R93.41 ABNORMAL COMPUTED TOMOGRAPHY OF URETER: ICD-10-CM

## 2019-03-08 DIAGNOSIS — Z09 FOLLOW-UP EXAM AFTER TREATMENT: ICD-10-CM

## 2019-03-08 DIAGNOSIS — C67.9 MALIGNANT NEOPLASM OF URINARY BLADDER, UNSPECIFIED SITE (HCC): Primary | ICD-10-CM

## 2019-03-08 PROBLEM — Z97.4 WEARS HEARING AID: Status: ACTIVE | Noted: 2019-01-24

## 2019-03-08 PROBLEM — Z97.3 WEARS GLASSES: Status: ACTIVE | Noted: 2019-01-24

## 2019-03-08 PROCEDURE — 99214 OFFICE O/P EST MOD 30 MIN: CPT | Performed by: FAMILY MEDICINE

## 2019-03-08 RX ORDER — TRAMADOL HYDROCHLORIDE 50 MG/1
TABLET ORAL
COMMUNITY
Start: 2019-03-07 | End: 2019-03-27

## 2019-03-08 NOTE — PROGRESS NOTES
Chelle Garza is a 80year old male. HPI:   Pt is here for ER/UC/hospital f/u.     ER/UC or hospital: ***    Date(s): ***    Reason for initial ER visit: ***    Records received and reviewed: ***    Pertinent results/diagnoses from ER: ***    Medications Suspension 2 sprays by Each Nare route daily.  Disp: 12.5 g Rfl: 3   AZELASTINE HCL 0.15 % Nasal Solution USE 2 SPRAYS IN EACH NOSTRIL TWICE DAILY Disp: 30 mL Rfl: 6        HISTORY:  Past Medical History:   Diagnosis Date   • Cancer ([facility].) [id number]    Sina without lesions nor inflammation  NECK: supple,no adenopathy, no thyromegaly  LUNGS: clear to auscultation  CARDIO: RRR without murmur  GI: good BS's,no masses, HSM or tenderness  EXTREMITIES: no cyanosis, clubbing or edema    ASSESSMENT AND PLAN:   There

## 2019-03-11 ENCOUNTER — TELEPHONE (OUTPATIENT)
Dept: FAMILY MEDICINE CLINIC | Facility: CLINIC | Age: 84
End: 2019-03-11

## 2019-03-11 NOTE — TELEPHONE ENCOUNTER
Like to talk to you before his appt ( he has to leave about 230pm), then after talking with him  He states that he will not make a decision until he talks with you- he states that you can call him tomorrow instead

## 2019-03-11 NOTE — TELEPHONE ENCOUNTER
Patient is meeting with the specialist at [facility] this afternoon at 4:30pm. Patient would like to speak with Dr Mike Montano regarding his appointment with them to discuss surgery as the specialist wants to remove both the Kidney and Bladder.  Glenis

## 2019-03-12 NOTE — TELEPHONE ENCOUNTER
Patient called to discuss his bladder/kidney.  Since our last visit he received a call that they actually did find aggressive cancer cells in the washings and then last night he talked to Dr. Kathleen Cooley  who said R kidney and bladder shoulder come out and they'r

## 2019-03-14 ENCOUNTER — TELEPHONE (OUTPATIENT)
Dept: FAMILY MEDICINE CLINIC | Facility: CLINIC | Age: 84
End: 2019-03-14

## 2019-03-14 NOTE — TELEPHONE ENCOUNTER
Calling to see if her name was on verbal release. I advised it was not, but that her father could sign a new one if he wants to add her. She said she would be in town next week and they may be in to update the form.

## 2019-03-16 NOTE — PROGRESS NOTES
Coleen Garcia is a 80year old male. HPI:   Pt is here for ER/UC/hospital f/u.     ER/UC or hospital: [facility]    Date(s): 3/2/19    Reason for initial ER visit: difficulty urinating    Records received and reviewed: yes    Pertinent results/diagnoses fr HYDROcodone-acetaminophen 5-325 MG Oral Tab Take 1 tablet by mouth every 6 (six) hours as needed for Pain. I do not take this medication with plain Tylenol.  Disp: 24 tablet Rfl: 0   Ciclesonide 50 MCG/ACT Nasal Suspension 2 sprays by Each Nare route forrest sad/surprised/uncertain/down as he's contemplating his mortality  NEURO: denies headaches    EXAM:   /54   Pulse 71   Temp 97.8 °F (36.6 °C) (Temporal)   Ht 63.5\"   Wt 144 lb   SpO2 99%   BMI 25.11 kg/m²   GENERAL: well developed, well nourished,in

## 2019-03-20 ENCOUNTER — TELEPHONE (OUTPATIENT)
Dept: FAMILY MEDICINE CLINIC | Facility: CLINIC | Age: 84
End: 2019-03-20

## 2019-03-20 NOTE — TELEPHONE ENCOUNTER
Future Appointments   Date Time Provider Bel Silva   3/25/2019  1:45 PM Juan Escobar [place] [facility]     Left message for the pt to call back  There was a cancellation on Monday- need to advise pt of the time and date that Dr. Barbi Bruce can see

## 2019-03-20 NOTE — TELEPHONE ENCOUNTER
Pt called back and I advised that I was able to get him in with Dr. Ray Deajose alfredo on Monday at 145- he v/u

## 2019-03-20 NOTE — TELEPHONE ENCOUNTER
Pt needs pre-op for kidney surgery on 4-1-19, Dr. Marisa Joyner @ [facility]. Pt to bring orders over this afternoon. Where can we make appt for pt next week?

## 2019-03-25 ENCOUNTER — OFFICE VISIT (OUTPATIENT)
Dept: FAMILY MEDICINE CLINIC | Facility: CLINIC | Age: 84
End: 2019-03-25
Payer: MEDICARE

## 2019-03-25 VITALS
OXYGEN SATURATION: 99 % | TEMPERATURE: 98 F | WEIGHT: 143 LBS | BODY MASS INDEX: 25 KG/M2 | SYSTOLIC BLOOD PRESSURE: 142 MMHG | HEART RATE: 81 BPM | DIASTOLIC BLOOD PRESSURE: 60 MMHG

## 2019-03-25 DIAGNOSIS — R20.2 PARESTHESIA OF ARM: ICD-10-CM

## 2019-03-25 DIAGNOSIS — I10 ESSENTIAL HYPERTENSION: ICD-10-CM

## 2019-03-25 DIAGNOSIS — G89.29 CHRONIC NECK PAIN: ICD-10-CM

## 2019-03-25 DIAGNOSIS — R42 DIZZINESS: ICD-10-CM

## 2019-03-25 DIAGNOSIS — M54.2 CHRONIC NECK PAIN: ICD-10-CM

## 2019-03-25 DIAGNOSIS — Z86.718 HISTORY OF DVT (DEEP VEIN THROMBOSIS): ICD-10-CM

## 2019-03-25 DIAGNOSIS — Z86.2 PERSONAL HISTORY OF DISEASES OF THE BLOOD AND BLOOD-FORMING ORGANS AND CERTAIN DISORDERS INVOLVING THE IMMUNE MECHANISM: ICD-10-CM

## 2019-03-25 DIAGNOSIS — Z93.6 OTHER ARTIFICIAL OPENINGS OF URINARY TRACT STATUS (HCC): ICD-10-CM

## 2019-03-25 DIAGNOSIS — Z86.19 HISTORY OF CLOSTRIDIUM DIFFICILE COLITIS: ICD-10-CM

## 2019-03-25 DIAGNOSIS — Z01.818 PRE-OP EVALUATION: ICD-10-CM

## 2019-03-25 DIAGNOSIS — C68.9: Primary | ICD-10-CM

## 2019-03-25 LAB
ALBUMIN SERPL-MCNC: 4.3 G/DL (ref 3.4–5)
ALBUMIN/GLOB SERPL: 1.3 {RATIO} (ref 1–2)
ALP LIVER SERPL-CCNC: 69 U/L (ref 45–117)
ALT SERPL-CCNC: 32 U/L (ref 16–61)
ANION GAP SERPL CALC-SCNC: 6 MMOL/L (ref 0–18)
AST SERPL-CCNC: 20 U/L (ref 15–37)
BASOPHILS # BLD AUTO: 0.03 X10(3) UL (ref 0–0.2)
BASOPHILS NFR BLD AUTO: 0.6 %
BILIRUB SERPL-MCNC: 0.6 MG/DL (ref 0.1–2)
BILIRUB UR QL STRIP.AUTO: NEGATIVE
BUN BLD-MCNC: 54 MG/DL (ref 7–18)
BUN/CREAT SERPL: 28.7 (ref 10–20)
CALCIUM BLD-MCNC: 9.1 MG/DL (ref 8.5–10.1)
CHLORIDE SERPL-SCNC: 108 MMOL/L (ref 98–107)
CO2 SERPL-SCNC: 20 MMOL/L (ref 21–32)
CREAT BLD-MCNC: 1.88 MG/DL (ref 0.7–1.3)
DEPRECATED RDW RBC AUTO: 44.9 FL (ref 35.1–46.3)
EOSINOPHIL # BLD AUTO: 0.17 X10(3) UL (ref 0–0.7)
EOSINOPHIL NFR BLD AUTO: 3.2 %
ERYTHROCYTE [DISTWIDTH] IN BLOOD BY AUTOMATED COUNT: 13.1 % (ref 11–15)
GLOBULIN PLAS-MCNC: 3.4 G/DL (ref 2.8–4.4)
GLUCOSE BLD-MCNC: 104 MG/DL (ref 70–99)
GLUCOSE UR STRIP.AUTO-MCNC: NEGATIVE MG/DL
HCT VFR BLD AUTO: 37.6 % (ref 39–53)
HGB BLD-MCNC: 12.5 G/DL (ref 13–17.5)
IMM GRANULOCYTES # BLD AUTO: 0.04 X10(3) UL (ref 0–1)
IMM GRANULOCYTES NFR BLD: 0.8 %
INR BLD: 0.95 (ref 0.9–1.1)
KETONES UR STRIP.AUTO-MCNC: NEGATIVE MG/DL
LYMPHOCYTES # BLD AUTO: 1.03 X10(3) UL (ref 1–4)
LYMPHOCYTES NFR BLD AUTO: 19.5 %
M PROTEIN MFR SERPL ELPH: 7.7 G/DL (ref 6.4–8.2)
MCH RBC QN AUTO: 30.9 PG (ref 26–34)
MCHC RBC AUTO-ENTMCNC: 33.2 G/DL (ref 31–37)
MCV RBC AUTO: 92.8 FL (ref 80–100)
MONOCYTES # BLD AUTO: 0.43 X10(3) UL (ref 0.1–1)
MONOCYTES NFR BLD AUTO: 8.1 %
NEUTROPHILS # BLD AUTO: 3.59 X10 (3) UL (ref 1.5–7.7)
NEUTROPHILS # BLD AUTO: 3.59 X10(3) UL (ref 1.5–7.7)
NEUTROPHILS NFR BLD AUTO: 67.8 %
NITRITE UR QL STRIP.AUTO: NEGATIVE
OSMOLALITY SERPL CALC.SUM OF ELEC: 293 MOSM/KG (ref 275–295)
PH UR STRIP.AUTO: 5 [PH] (ref 4.5–8)
PLATELET # BLD AUTO: 304 10(3)UL (ref 150–450)
POTASSIUM SERPL-SCNC: 6.4 MMOL/L (ref 3.5–5.1)
PROT UR STRIP.AUTO-MCNC: NEGATIVE MG/DL
PSA SERPL DL<=0.01 NG/ML-MCNC: 13 SECONDS (ref 12.5–14.7)
RBC # BLD AUTO: 4.05 X10(6)UL (ref 3.8–5.8)
SODIUM SERPL-SCNC: 134 MMOL/L (ref 136–145)
SP GR UR STRIP.AUTO: 1.01 (ref 1–1.03)
UROBILINOGEN UR STRIP.AUTO-MCNC: <2 MG/DL
WBC # BLD AUTO: 5.3 X10(3) UL (ref 4–11)
WBC #/AREA URNS AUTO: >50 /HPF

## 2019-03-25 PROCEDURE — 36415 COLL VENOUS BLD VENIPUNCTURE: CPT | Performed by: FAMILY MEDICINE

## 2019-03-25 PROCEDURE — 85730 THROMBOPLASTIN TIME PARTIAL: CPT | Performed by: FAMILY MEDICINE

## 2019-03-25 PROCEDURE — 80053 COMPREHEN METABOLIC PANEL: CPT | Performed by: FAMILY MEDICINE

## 2019-03-25 PROCEDURE — 87086 URINE CULTURE/COLONY COUNT: CPT | Performed by: FAMILY MEDICINE

## 2019-03-25 PROCEDURE — 85025 COMPLETE CBC W/AUTO DIFF WBC: CPT | Performed by: FAMILY MEDICINE

## 2019-03-25 PROCEDURE — 81001 URINALYSIS AUTO W/SCOPE: CPT | Performed by: FAMILY MEDICINE

## 2019-03-25 PROCEDURE — 93000 ELECTROCARDIOGRAM COMPLETE: CPT | Performed by: FAMILY MEDICINE

## 2019-03-25 PROCEDURE — 85610 PROTHROMBIN TIME: CPT | Performed by: FAMILY MEDICINE

## 2019-03-25 PROCEDURE — 99214 OFFICE O/P EST MOD 30 MIN: CPT | Performed by: FAMILY MEDICINE

## 2019-03-25 NOTE — H&P
Lamont Holland is a 80year old male who presents for a pre-operative physical exam.     Patient is to have R robotic nephrectomy, to be done by Dr. Lavon Almanza at [facility] on 4/1/19.       HPI:   Reason for surgery: urothelial CA    Prior intra- or post-op comp UNITS Oral Cap Take 1 capsule by mouth daily. Disp:  Rfl:    Elastic Bandages & Supports (MEDICAL COMPRESSION STOCKINGS) Does not apply Misc 1 Units by Does not apply route daily.  Disp: 2 Each Rfl: 1   hydrochlorothiazide 12.5 MG Oral Tab Take 1 tablet ( Used      Tobacco comment: Non-smoker    Alcohol use: No      Alcohol/week: 0.0 oz    Drug use: No        REVIEW OF SYSTEMS:   GENERAL: feels well otherwise  SKIN: denies any unusual skin lesions  EYES:denies blurred vision or double vision  HEENT: denies Future  - PTT, ACTIVATED;  Future  - ELECTROCARDIOGRAM, COMPLETE  - URINALYSIS, ROUTINE; Future  - URINE CULTURE, ROUTINE; Future  - CBC WITH DIFFERENTIAL WITH PLATELET  - COMP METABOLIC PANEL (14)  - PROTHROMBIN TIME (PT)  - PTT, ACTIVATED  - URINALYSIS, R

## 2019-03-26 ENCOUNTER — TELEPHONE (OUTPATIENT)
Dept: FAMILY MEDICINE CLINIC | Facility: CLINIC | Age: 84
End: 2019-03-26

## 2019-03-26 DIAGNOSIS — E87.5 HYPERKALEMIA: Primary | ICD-10-CM

## 2019-03-26 LAB — APTT PPP: 33.3 SECONDS (ref 26.1–34.6)

## 2019-03-26 RX ORDER — HYDROCHLOROTHIAZIDE 12.5 MG/1
12.5 TABLET ORAL DAILY
Qty: 30 TABLET | Refills: 0 | Status: SHIPPED | OUTPATIENT
Start: 2019-03-26 | End: 2019-04-03

## 2019-03-26 RX ORDER — HYDROCHLOROTHIAZIDE 12.5 MG/1
TABLET ORAL
Qty: 90 TABLET | Refills: 0 | OUTPATIENT
Start: 2019-03-26

## 2019-03-26 NOTE — TELEPHONE ENCOUNTER
Message   Received:  Today   Message Contents   MARLENE Justin Nurse   Caller: PT (Today,  1:03 PM)             PT RETURNED CALL, [address]

## 2019-03-26 NOTE — TELEPHONE ENCOUNTER
Called the pt and advised him of the recommendations from Dr. Gonzales Bella. He tells me that he is already taking 1/2 tab of the spironoloctone. He is agreeable to the HCTZ and we scheduled his appt for blood work for Friday.   Future Appointments   Date Time Prov

## 2019-03-26 NOTE — TELEPHONE ENCOUNTER
----- Message from Angeli Marshall MD sent at 3/26/2019  8:08 AM CDT -----  Please notify patient EKG looks good, labs look good except his potassium is too high, this is likely from a combo of his mild renal impairment + spironlactone.   I'd like him to cut

## 2019-03-27 ENCOUNTER — TELEPHONE (OUTPATIENT)
Dept: FAMILY MEDICINE CLINIC | Facility: CLINIC | Age: 84
End: 2019-03-27

## 2019-03-27 PROBLEM — C68.9 UROTHELIAL CANCER (HCC): Status: ACTIVE | Noted: 2019-03-20

## 2019-03-27 NOTE — TELEPHONE ENCOUNTER
Received medical records from [address] [place] [address] for [facility].    Records given to [facility]

## 2019-03-27 NOTE — TELEPHONE ENCOUNTER
Faxed the H&P, EKG and Labs to Dr. Quinn Holland @ [phone]  Advised on face sheet that we are rechecking the potassium level on Friday

## 2019-03-29 ENCOUNTER — NURSE ONLY (OUTPATIENT)
Dept: FAMILY MEDICINE CLINIC | Facility: CLINIC | Age: 84
End: 2019-03-29
Payer: MEDICARE

## 2019-03-29 ENCOUNTER — TELEPHONE (OUTPATIENT)
Dept: FAMILY MEDICINE CLINIC | Facility: CLINIC | Age: 84
End: 2019-03-29

## 2019-03-29 ENCOUNTER — MED REC SCAN ONLY (OUTPATIENT)
Dept: FAMILY MEDICINE CLINIC | Facility: CLINIC | Age: 84
End: 2019-03-29

## 2019-03-29 DIAGNOSIS — E87.5 HYPERKALEMIA: ICD-10-CM

## 2019-03-29 LAB
ANION GAP SERPL CALC-SCNC: 4 MMOL/L (ref 0–18)
BUN BLD-MCNC: 63 MG/DL (ref 7–18)
BUN/CREAT SERPL: 30.4 (ref 10–20)
CALCIUM BLD-MCNC: 9.6 MG/DL (ref 8.5–10.1)
CHLORIDE SERPL-SCNC: 107 MMOL/L (ref 98–107)
CO2 SERPL-SCNC: 26 MMOL/L (ref 21–32)
CREAT BLD-MCNC: 2.07 MG/DL (ref 0.7–1.3)
GLUCOSE BLD-MCNC: 183 MG/DL (ref 70–99)
OSMOLALITY SERPL CALC.SUM OF ELEC: 307 MOSM/KG (ref 275–295)
POTASSIUM SERPL-SCNC: 5.8 MMOL/L (ref 3.5–5.1)
SODIUM SERPL-SCNC: 137 MMOL/L (ref 136–145)

## 2019-03-29 PROCEDURE — 36415 COLL VENOUS BLD VENIPUNCTURE: CPT | Performed by: FAMILY MEDICINE

## 2019-03-29 PROCEDURE — 80048 BASIC METABOLIC PNL TOTAL CA: CPT | Performed by: FAMILY MEDICINE

## 2019-03-29 RX ORDER — SODIUM POLYSTYRENE SULFONATE 15 G/60ML
SUSPENSION ORAL; RECTAL
Qty: 120 ML | Refills: 0 | OUTPATIENT
Start: 2019-03-29 | End: 2019-06-19

## 2019-03-29 RX ORDER — SODIUM POLYSTYRENE SULFONATE 15 G/60ML
15 SUSPENSION ORAL; RECTAL ONCE
Qty: 60 ML | Refills: 0 | Status: SHIPPED | OUTPATIENT
Start: 2019-03-29 | End: 2019-03-29 | Stop reason: CLARIF

## 2019-03-29 NOTE — TELEPHONE ENCOUNTER
Please notify patient potassium did come down, but not quite to where we'd like it but his Cr also went up (that HCTZ drying him out a bit dung much. Stop the HCTZ so Cr can come down.   Let's start kayexalate 15g BID x 2 days , and we'll need to contact carrasco

## 2019-03-29 NOTE — TELEPHONE ENCOUNTER
Pt called, do we have the results from this mornings blood draw? Please call pt at [phone]. [place] [facility] [place]

## 2019-03-29 NOTE — TELEPHONE ENCOUNTER
Patient advised. Verbalized understanding. Kayexalate sent to [place] on [address].    Patient having surgery 4/1/19 with Dr Marci Paiz at [facility]

## 2019-04-02 ENCOUNTER — TELEPHONE (OUTPATIENT)
Dept: FAMILY MEDICINE CLINIC | Facility: CLINIC | Age: 84
End: 2019-04-02

## 2019-04-02 NOTE — TELEPHONE ENCOUNTER
Pt had the robotic assisted nephroureterectomy done and is doing well.  BP is running 116-150/55-80  K+ 5.1 and they are monitoring this  CR 1.73  They are holding the ibersartan currently and he is on Amlodipine 5mg    They are anticipating discharge brandi

## 2019-04-03 NOTE — TELEPHONE ENCOUNTER
I had stopped the HCTZ, it really lowered his sodium and raised his Cr previously, please notify them of this, not sure why it was restarted in hospital but may want to stop it

## 2019-04-03 NOTE — TELEPHONE ENCOUNTER
See telephone encounter from 3/29/19     HCTZ was to be stopped   Will call Deena Jerry and advise of the change

## 2019-04-03 NOTE — TELEPHONE ENCOUNTER
Left a message for Joanne Abdalla NP- about the HCTZ and that it was discontinued.  Advised to call back if questions and that I am not in the office tomorrow and she can leave me a message

## 2019-04-03 NOTE — TELEPHONE ENCOUNTER
Hold the irbesartan, increase amlodipine to 10mg daily if BP not <145/85 in hospital. Can consider re-adding ARB down the road pending how kidney function looks 6 months down the road

## 2019-04-03 NOTE — TELEPHONE ENCOUNTER
Braulio Saucedo from [facility] called back and the pt is going to be staying another day. His creatinine last night was 2.14 and today 2.01, his K+ 4.1 and sodium is a little low today 130.  He is being hep locked and he got a dose of oxy last night- this is being d/c'd

## 2019-04-05 ENCOUNTER — TELEPHONE (OUTPATIENT)
Dept: FAMILY MEDICINE CLINIC | Facility: CLINIC | Age: 84
End: 2019-04-05

## 2019-04-05 DIAGNOSIS — E87.6 HYPOKALEMIA: Primary | ICD-10-CM

## 2019-04-05 PROBLEM — IMO0002 SOLITARY LEFT KIDNEY: Status: ACTIVE | Noted: 2019-04-05

## 2019-04-05 PROBLEM — K91.89 POSTOPERATIVE ILEUS (HCC): Status: ACTIVE | Noted: 2019-04-03

## 2019-04-05 PROBLEM — K56.7 POSTOPERATIVE ILEUS (HCC): Status: ACTIVE | Noted: 2019-04-03

## 2019-04-05 PROBLEM — Z97.8 FOLEY CATHETER IN PLACE: Status: ACTIVE | Noted: 2019-04-02

## 2019-04-05 NOTE — TELEPHONE ENCOUNTER
Left message for Bev Chaney NP to call back  Need to get her opinion on abx and the pt's hx of cdif    Asked her to call back

## 2019-04-05 NOTE — TELEPHONE ENCOUNTER
Pt was advised by NP at [facility] that his creatinin is 2.1, that he should check with [address] on this. Does [address] need to see pt? . Pt will be going to [place] on Monday. Pt stated that he wanted to talk to [address] or nurse.  He states that the schedulers at [facility] don't always schedul

## 2019-04-05 NOTE — TELEPHONE ENCOUNTER
I don't have an opinion on this without hearing from prescribing office their reason for prescribing it and why this time since he's not had prior to previous cystos, can you please find out, make sure they know about hx of c dif thanks, then I can give Geneva Bañuelos

## 2019-04-05 NOTE — TELEPHONE ENCOUNTER
Carmen Silva, NP @ [facility]  Asked her about the abx and she really wanted to prescribe keflex, but after talking with the pt and he was not agreeable to the keflex she prescribed Cipro, and metronidazole to be taken together to try and prevent cdif.     I as

## 2019-04-05 NOTE — TELEPHONE ENCOUNTER
Spoke with the pt and he is getting conflicting instructions-   NP wants to put the pt on an abx and he was told that there is a med to prevent cdif. He doesn't want to take this.     He wants to talk to Dr. Omer Duenas directly-     He is going to have a procedur

## 2019-04-05 NOTE — TELEPHONE ENCOUNTER
Patient called to discuss 4 items:    1) He was prescribed cipro + flagyl, wonders why and what he should be taking.  I told him I don't have an independent opinion on this as I don't know the literature behind his specific surgery, his specific situation a

## 2019-04-06 NOTE — TELEPHONE ENCOUNTER
Left message on voicemail/answering machine for patient to call office    Please schedule patient for nurse visit Tuesday 4/9 for non fasting labs.  Orders in epic

## 2019-04-08 NOTE — TELEPHONE ENCOUNTER
Future Appointments   Date Time Provider Bel Silva   4/9/2019 11:00 AM  [facility] [address] EM[facility] Allyson Smith

## 2019-04-09 ENCOUNTER — NURSE ONLY (OUTPATIENT)
Dept: FAMILY MEDICINE CLINIC | Facility: CLINIC | Age: 84
End: 2019-04-09
Payer: MEDICARE

## 2019-04-09 DIAGNOSIS — E87.6 HYPOKALEMIA: ICD-10-CM

## 2019-04-09 PROCEDURE — 36415 COLL VENOUS BLD VENIPUNCTURE: CPT | Performed by: FAMILY MEDICINE

## 2019-04-09 PROCEDURE — 80048 BASIC METABOLIC PNL TOTAL CA: CPT | Performed by: FAMILY MEDICINE

## 2019-04-09 PROCEDURE — 83735 ASSAY OF MAGNESIUM: CPT | Performed by: FAMILY MEDICINE

## 2019-04-09 NOTE — PROGRESS NOTES
Mint tube drawn from right arm with 23g butterfly needle x1. Pt didier well.  Pt left the clinic in stable condition

## 2019-04-11 ENCOUNTER — PATIENT MESSAGE (OUTPATIENT)
Dept: FAMILY MEDICINE CLINIC | Facility: CLINIC | Age: 84
End: 2019-04-11

## 2019-04-12 ENCOUNTER — TELEPHONE (OUTPATIENT)
Dept: FAMILY MEDICINE CLINIC | Facility: CLINIC | Age: 84
End: 2019-04-12

## 2019-04-12 DIAGNOSIS — E87.5 HYPERKALEMIA: ICD-10-CM

## 2019-04-12 DIAGNOSIS — E87.1 HYPONATREMIA: Primary | ICD-10-CM

## 2019-04-12 NOTE — TELEPHONE ENCOUNTER
Called the pt and scheduled an appt for next week  Basic to be drawn around the 16th- order placed    Future Appointments   Date Time Provider Bel Silva   4/16/2019 11:00 AM  [facility] [address] EMG Fara Escalante

## 2019-04-12 NOTE — TELEPHONE ENCOUNTER
Patient called and states that he thinks he is due for some labs (Dr Maris Cuevas sent him a [employer]). Need order and needs to be scheduled.

## 2019-04-12 NOTE — TELEPHONE ENCOUNTER
From: Sailaja Best  To: Ed Leung MD  Sent: 4/11/2019 5:17 PM CDT  Subject: Non-Urgent Medical Question    -The catheder is out & have been able to urinate with short intervals. Feeling better starting yesterday. Harv

## 2019-04-16 ENCOUNTER — NURSE ONLY (OUTPATIENT)
Dept: FAMILY MEDICINE CLINIC | Facility: CLINIC | Age: 84
End: 2019-04-16
Payer: MEDICARE

## 2019-04-16 DIAGNOSIS — E87.5 HYPERKALEMIA: ICD-10-CM

## 2019-04-16 DIAGNOSIS — E87.1 HYPONATREMIA: ICD-10-CM

## 2019-04-16 PROCEDURE — 80048 BASIC METABOLIC PNL TOTAL CA: CPT | Performed by: FAMILY MEDICINE

## 2019-04-16 PROCEDURE — 36415 COLL VENOUS BLD VENIPUNCTURE: CPT | Performed by: FAMILY MEDICINE

## 2019-04-16 NOTE — PROGRESS NOTES
Patient to clinic for BMP per Dr Ramiro Hastings. Mint tube drawn left lateral AC x 1 attempt    Patient brought list of BP readings.   Readings given to Dr aRmiro Hastings, see tel enc

## 2019-04-24 ENCOUNTER — TELEPHONE (OUTPATIENT)
Dept: FAMILY MEDICINE CLINIC | Facility: CLINIC | Age: 84
End: 2019-04-24

## 2019-04-24 DIAGNOSIS — E87.5 HYPERKALEMIA: Primary | ICD-10-CM

## 2019-04-24 NOTE — TELEPHONE ENCOUNTER
How much of his day is he tense vs relaxed?  If mostly tense I would double amlodipnie, but if mostly relaxed we're probably good

## 2019-04-24 NOTE — TELEPHONE ENCOUNTER
Spoke with the pt and explained the notes from Dr. Cara Reynolds- he states that he is half tense and half relaxed- so he wonders if he should take a half of a 2nd dose of amlodipine.   I checked with Dr. Cara Reynolds and she is agreeable to this and would like the pt to [facility]

## 2019-04-24 NOTE — TELEPHONE ENCOUNTER
Called the pt and he states that his BP is running 160's and dropping to 140's after he relaxes.   HR starts in the 80 and as he relaxes it drops down to high 50's  Urinating without trouble      He is just taking amlodpine 5mg daily  Do you want to up this

## 2019-04-24 NOTE — TELEPHONE ENCOUNTER
PT CALLED AND ADV THAT HE WAS TAKEN OFF 2 BP MEDS.  PT HAS NOTICED THAT HIS BP IS ELEVATING.    WOULD LIKE TO DISCUSS BP MEDS    PLEASE CALL WHEN AVAILABLE    THANK YOU

## 2019-04-27 ENCOUNTER — MED REC SCAN ONLY (OUTPATIENT)
Dept: FAMILY MEDICINE CLINIC | Facility: CLINIC | Age: 84
End: 2019-04-27

## 2019-05-14 ENCOUNTER — NURSE ONLY (OUTPATIENT)
Dept: FAMILY MEDICINE CLINIC | Facility: CLINIC | Age: 84
End: 2019-05-14
Payer: MEDICARE

## 2019-05-14 DIAGNOSIS — E87.5 HYPERKALEMIA: ICD-10-CM

## 2019-05-14 PROCEDURE — 80048 BASIC METABOLIC PNL TOTAL CA: CPT | Performed by: FAMILY MEDICINE

## 2019-05-14 NOTE — PROGRESS NOTES
Mint tube drawn from right arm with 21g butterfly needle x1. Pt didier well.  Pt left the clinic in stable condition

## 2019-05-18 ENCOUNTER — MED REC SCAN ONLY (OUTPATIENT)
Dept: FAMILY MEDICINE CLINIC | Facility: CLINIC | Age: 84
End: 2019-05-18

## 2019-05-21 ENCOUNTER — TELEPHONE (OUTPATIENT)
Dept: FAMILY MEDICINE CLINIC | Facility: CLINIC | Age: 84
End: 2019-05-21

## 2019-05-21 DIAGNOSIS — I10 ESSENTIAL HYPERTENSION: ICD-10-CM

## 2019-05-21 DIAGNOSIS — N18.30 CHRONIC KIDNEY DISEASE, STAGE III (MODERATE) (HCC): Primary | ICD-10-CM

## 2019-05-21 DIAGNOSIS — D64.9 ANEMIA, UNSPECIFIED TYPE: ICD-10-CM

## 2019-05-21 NOTE — TELEPHONE ENCOUNTER
Called Dr. Pepe Hopper office to find out when he wants to repeat labs    Left a message for the nurse and they will get back to us.

## 2019-05-28 NOTE — TELEPHONE ENCOUNTER
Message   Received: Today   Message Contents   MARLENE Hale Nurse   Caller: MORGAN - DR RANDOLPH (Today, 10:22 AM)             MORGAN FROM DR RANDOLPH'S OFFICE CALLED BACK.      [phone]

## 2019-05-28 NOTE — TELEPHONE ENCOUNTER
Called again and now was forwarded to nephrology spoke with Edwar Curiel-   They are sending a note to Dr. Jud Oseguera to get follow up lab instructions  Expected turn around time is 1-2 business days

## 2019-05-29 NOTE — TELEPHONE ENCOUNTER
Orders received and entered    Fax results to [phone]- sent to scan      Called the pt and advised of the lab orders- he v/u and we scheduled NV  Future Appointments   Date Time Provider Bel Silva   6/10/2019 11:00 AM  [facility] [address]

## 2019-06-10 ENCOUNTER — NURSE ONLY (OUTPATIENT)
Dept: FAMILY MEDICINE CLINIC | Facility: CLINIC | Age: 84
End: 2019-06-10
Payer: MEDICARE

## 2019-06-10 DIAGNOSIS — I10 ESSENTIAL HYPERTENSION: ICD-10-CM

## 2019-06-10 DIAGNOSIS — D64.9 ANEMIA, UNSPECIFIED TYPE: ICD-10-CM

## 2019-06-10 DIAGNOSIS — N18.30 CHRONIC KIDNEY DISEASE, STAGE III (MODERATE) (HCC): ICD-10-CM

## 2019-06-10 PROCEDURE — 81001 URINALYSIS AUTO W/SCOPE: CPT | Performed by: FAMILY MEDICINE

## 2019-06-10 PROCEDURE — 82306 VITAMIN D 25 HYDROXY: CPT | Performed by: FAMILY MEDICINE

## 2019-06-10 PROCEDURE — 84100 ASSAY OF PHOSPHORUS: CPT | Performed by: FAMILY MEDICINE

## 2019-06-10 PROCEDURE — 84156 ASSAY OF PROTEIN URINE: CPT | Performed by: FAMILY MEDICINE

## 2019-06-10 PROCEDURE — 82728 ASSAY OF FERRITIN: CPT | Performed by: FAMILY MEDICINE

## 2019-06-10 PROCEDURE — 82570 ASSAY OF URINE CREATININE: CPT | Performed by: FAMILY MEDICINE

## 2019-06-10 PROCEDURE — 82565 ASSAY OF CREATININE: CPT | Performed by: FAMILY MEDICINE

## 2019-06-10 PROCEDURE — 83970 ASSAY OF PARATHORMONE: CPT | Performed by: FAMILY MEDICINE

## 2019-06-10 PROCEDURE — 82310 ASSAY OF CALCIUM: CPT | Performed by: FAMILY MEDICINE

## 2019-06-10 NOTE — PROGRESS NOTES
Mint and lav tubes drawn for Dr. Deanne Nguyen- ferritin, pth with minerals and vit d from left arm with 23g butterfly needle x1. Pt didier well.  Pt left the clinic in stable condition      Urine collected

## 2019-06-14 ENCOUNTER — TELEPHONE (OUTPATIENT)
Dept: FAMILY MEDICINE CLINIC | Facility: CLINIC | Age: 84
End: 2019-06-14

## 2019-06-14 NOTE — TELEPHONE ENCOUNTER
----- Message from Roberto Moore MD sent at 6/13/2019 10:31 PM CDT -----  Please forward results to ordering provider, thanks

## 2019-06-19 ENCOUNTER — OFFICE VISIT (OUTPATIENT)
Dept: FAMILY MEDICINE CLINIC | Facility: CLINIC | Age: 84
End: 2019-06-19
Payer: MEDICARE

## 2019-06-19 VITALS
BODY MASS INDEX: 25 KG/M2 | HEART RATE: 68 BPM | TEMPERATURE: 98 F | WEIGHT: 144.81 LBS | SYSTOLIC BLOOD PRESSURE: 170 MMHG | RESPIRATION RATE: 14 BRPM | DIASTOLIC BLOOD PRESSURE: 60 MMHG

## 2019-06-19 DIAGNOSIS — C67.2 MALIGNANT NEOPLASM OF LATERAL WALL OF URINARY BLADDER (HCC): ICD-10-CM

## 2019-06-19 DIAGNOSIS — C68.9 UROTHELIAL CANCER (HCC): ICD-10-CM

## 2019-06-19 DIAGNOSIS — N28.9 RENAL INSUFFICIENCY: Primary | ICD-10-CM

## 2019-06-19 DIAGNOSIS — E87.6 HYPOKALEMIA: ICD-10-CM

## 2019-06-19 DIAGNOSIS — IMO0002 SOLITARY LEFT KIDNEY: ICD-10-CM

## 2019-06-19 DIAGNOSIS — I10 ESSENTIAL HYPERTENSION: ICD-10-CM

## 2019-06-19 DIAGNOSIS — F43.9 STRESS AT HOME: ICD-10-CM

## 2019-06-19 PROCEDURE — 99215 OFFICE O/P EST HI 40 MIN: CPT | Performed by: FAMILY MEDICINE

## 2019-06-19 PROCEDURE — 80048 BASIC METABOLIC PNL TOTAL CA: CPT | Performed by: FAMILY MEDICINE

## 2019-06-19 NOTE — PROGRESS NOTES
Fern Chopra is a 80year old male.   HPI:   Patient wants to update me on his care at [facility] and have me help facilitate communication and make sure nothing falls through the cracks between me, his urologist Dr. Nicolas Contreras and Bienvenido Kennedy, nephrologist Dr. Ghazala Lee (deep venous thrombosis) ([facility] 2/5/2013   • Essential hypertension    • Essential hypertension, benign 10/26/09   • History of pulmonary embolism 2/5/2013   • Impaired fasting glucose 7/15/10   • Kidney mass     stable x 7 years   • Nonspecific abnormal el edema    ASSESSMENT AND PLAN:   Diagnoses and all orders for this visit:  This visit is primarily for counseling, spent 45 min with pt >50% of time in counseling and review of below:     Renal insufficiency-reassess today, will send results to nephro and D

## 2019-06-26 ENCOUNTER — TELEPHONE (OUTPATIENT)
Dept: FAMILY MEDICINE CLINIC | Facility: CLINIC | Age: 84
End: 2019-06-26

## 2019-06-26 NOTE — TELEPHONE ENCOUNTER
Venecia Soliz called back and advised that there is no plan to do any re-imaging at this time- this will be addressed as needed

## 2019-06-26 NOTE — TELEPHONE ENCOUNTER
[facility]- looks like a note from Carole Cope RN at Dr. Lesia Sharif office indicte:  Informed urine cytology positive for high grade carcinoma, pt will f/u in clinic.     Electronically signed by Valorie Aguirre RN at 06/03/2019 2:46 PM CDT        Pt is scheduled for c

## 2019-06-26 NOTE — TELEPHONE ENCOUNTER
Telephone Encounter - Lindy Bonner., RN - 06/10/2019 6:23 PM CDT  Pt with intermittent hematuria, 6/10/19 resolved.  Maybe prostate related, 7/19/19 scheduled for cystoscopy 2 months after previous cysto to assess for bladder cancer recurrence.    Elec

## 2019-06-26 NOTE — TELEPHONE ENCOUNTER
Dr. Kashif Hernandez this is the last note from Dr. Santosh Freed- let me know if you still want me to call his office or Dr. Marlena Melchor    Assessment/Plan:  Mr. Leeanne Watts is an 80 y.o. 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

## 2019-06-26 NOTE — TELEPHONE ENCOUNTER
Noted, thanks for the info. No need to page Dr. Nicolas Contreras, but would like his opinion on getting CT chest with contrast (a little concern with him having one remaining kidney with giving contrast?) to eval for metastatic dz.   thanks

## 2019-06-26 NOTE — TELEPHONE ENCOUNTER
Thanks Rod Franklin, please call Dr. Humza Rivera office for info on survellience instead, thank you!

## 2019-06-27 ENCOUNTER — PATIENT MESSAGE (OUTPATIENT)
Dept: FAMILY MEDICINE CLINIC | Facility: CLINIC | Age: 84
End: 2019-06-27

## 2019-06-27 NOTE — TELEPHONE ENCOUNTER
From: Ary Best  To: Estephanie Smith MD  Sent: 6/27/2019 11:02 AM CDT  Subject: Non-Urgent Medical Question    Chase Standard-  Thank you for your follow with [facility] MOORE! Also want to thank you for the advice you gave me on my last visit.  I was having a

## 2019-07-01 NOTE — TELEPHONE ENCOUNTER
[phone]  called Dr. Nicholas De Los Santos office to get his input on the use of contrast.  Left message for Dr. Quinn Holland to call back

## 2019-07-02 NOTE — TELEPHONE ENCOUNTER
Eren Cabrales the nurse at Dr. Delgado Lose office called and she tells me that the pt has had imaging with contrast since his nephrectomy and the concern is the cr level  She tells me that his cr prior to the last scan was 1.6 and it went up to 1.9 after the exam  She

## 2019-07-03 NOTE — TELEPHONE ENCOUNTER
Thanks for the info, but I'm wondering if he thinks Ct Chest with contrast something we should order for cancer monitoring/survellience, or if no need.   Dr. Marisa Joyner is monitoring patient's cancer and patient is wondering if metastatic w/u should be done and

## 2019-07-03 NOTE — TELEPHONE ENCOUNTER
Can you please clarify what it is you are needing. I had spoke with Dr. Juni Cary RN about CT chest for metastatic CA and he said he had no plan for this and then I call again about the contrast and was told to monitor that CR level.    I   am confused at [facility]

## 2019-07-05 NOTE — TELEPHONE ENCOUNTER
We're good, didn't see the below note about him not recommending metastatic workup at this point, thanks

## 2019-07-10 ENCOUNTER — MED REC SCAN ONLY (OUTPATIENT)
Dept: FAMILY MEDICINE CLINIC | Facility: CLINIC | Age: 84
End: 2019-07-10

## 2019-07-20 RX ORDER — LOVASTATIN 10 MG/1
TABLET ORAL
Qty: 90 TABLET | Refills: 3 | Status: SHIPPED | OUTPATIENT
Start: 2019-07-20 | End: 2020-01-01

## 2019-07-20 NOTE — TELEPHONE ENCOUNTER
Last refill: 7- #90 with 3 refills  Last Visit: 6-  Next Visit: No future appointments. Forward to Dr. Ramiro Hastings please advise on refills. Thanks.

## 2019-07-31 ENCOUNTER — PATIENT OUTREACH (OUTPATIENT)
Dept: FAMILY MEDICINE CLINIC | Facility: CLINIC | Age: 84
End: 2019-07-31

## 2019-08-05 ENCOUNTER — TELEPHONE (OUTPATIENT)
Dept: FAMILY MEDICINE CLINIC | Facility: CLINIC | Age: 84
End: 2019-08-05

## 2019-08-05 NOTE — TELEPHONE ENCOUNTER
Could offer nurse visit, but he has such white coat I'm tempted to just leave him be, not call him in, he checks bp at home and let's me know if averaging high

## 2019-08-05 NOTE — TELEPHONE ENCOUNTER
Please contact patient - needs b/p check - check to see if needs nurse appointment of doctor appointment. [facility] look and see if they have had a recent appointment. Pt last appointment was 6/19/2019 with no future appointments. OV or nurse appointment?

## 2019-08-05 NOTE — PROGRESS NOTES
Please contact patient - needs b/p check - check to see if needs nurse appointment of doctor appointment. [facility] look and see if they have had a recent appointment. Pt last OV 06/19/2019 and no future visits. OV or nurse appointment?

## 2019-08-12 ENCOUNTER — MED REC SCAN ONLY (OUTPATIENT)
Dept: FAMILY MEDICINE CLINIC | Facility: CLINIC | Age: 84
End: 2019-08-12

## 2019-08-19 NOTE — TELEPHONE ENCOUNTER
Last refilled on 8/8/18 for # 180 with 3 refills  Last OV 6/19/19  No future appointments. Thank you.

## 2019-08-20 RX ORDER — TAMSULOSIN HYDROCHLORIDE 0.4 MG/1
CAPSULE ORAL
Qty: 180 CAPSULE | Refills: 3 | Status: SHIPPED | OUTPATIENT
Start: 2019-08-20 | End: 2020-01-01

## 2019-08-30 ENCOUNTER — TELEPHONE (OUTPATIENT)
Dept: FAMILY MEDICINE CLINIC | Facility: CLINIC | Age: 84
End: 2019-08-30

## 2019-08-30 NOTE — TELEPHONE ENCOUNTER
PT STOPPED IN AND WOULD LIKE TO SEE WHEN THE SOONEST AVAILABLE THAT HE CAN GET IN. PT WOULD MUCH RATHER GET IN SOONER THAT LATER. ADV PT NOTHING WAS AVAILABLE NEXT WEEK AT ALL.     WONDERING IF HE CAN BE SQUEEZED IN    PLEASE ADV    THANK YOU

## 2019-09-04 NOTE — TELEPHONE ENCOUNTER
Sorry Mr Zondra Boxer, but I need to keep my one emergncy slot left this week for possible medically urgent situation.  We'll have to do next week, and can give him a 30 min SDA slot, thanks

## 2019-09-04 NOTE — TELEPHONE ENCOUNTER
Spoke with the pt sand he want review all the things that have been going on with [facility] Sanya WHATLEY.

## 2019-09-04 NOTE — TELEPHONE ENCOUNTER
What does he want to be seen for? If that urgent, is here or UC appropriate?   I could od 4:15 Friday if other person I offered it to doesn't take it

## 2019-09-04 NOTE — TELEPHONE ENCOUNTER
Called the pt and scheduled an appt  Future Appointments   Date Time Provider Bel Silva   9/9/2019 10:00 AM Deanne Savage MD [place] [facility] Cristina Alvarado

## 2019-09-09 ENCOUNTER — OFFICE VISIT (OUTPATIENT)
Dept: FAMILY MEDICINE CLINIC | Facility: CLINIC | Age: 84
End: 2019-09-09
Payer: MEDICARE

## 2019-09-09 VITALS
SYSTOLIC BLOOD PRESSURE: 140 MMHG | RESPIRATION RATE: 16 BRPM | DIASTOLIC BLOOD PRESSURE: 58 MMHG | HEART RATE: 68 BPM | WEIGHT: 143 LBS | OXYGEN SATURATION: 99 % | TEMPERATURE: 98 F | HEIGHT: 63.5 IN | BODY MASS INDEX: 25.02 KG/M2

## 2019-09-09 DIAGNOSIS — I10 ESSENTIAL HYPERTENSION: ICD-10-CM

## 2019-09-09 DIAGNOSIS — IMO0002 SOLITARY LEFT KIDNEY: ICD-10-CM

## 2019-09-09 DIAGNOSIS — C67.2 MALIGNANT NEOPLASM OF LATERAL WALL OF URINARY BLADDER (HCC): Primary | ICD-10-CM

## 2019-09-09 DIAGNOSIS — N28.9 RENAL INSUFFICIENCY: ICD-10-CM

## 2019-09-09 PROCEDURE — 99214 OFFICE O/P EST MOD 30 MIN: CPT | Performed by: FAMILY MEDICINE

## 2019-09-09 RX ORDER — LORAZEPAM 0.5 MG/1
0.5 TABLET ORAL 3 TIMES DAILY PRN
Qty: 90 TABLET | Refills: 0 | Status: SHIPPED | OUTPATIENT
Start: 2019-09-09 | End: 2020-01-01

## 2019-09-09 NOTE — PROGRESS NOTES
Abhishek Ruiz is a 80year old male.   HPI:   From 6/19 visit HPI:  \"Patient wants to update me on his care at [facility] and have me help facilitate communication and make sure nothing falls through the cracks between me, his urologist Dr. Mikhail Pickens and Mo Rey IN EACH NOSTRIL TWICE DAILY Disp: 30 mL Rfl: 6   Acidophilus/Pectin Oral Cap Take 1 capsule by mouth daily. Disp:  Rfl:    finasteride 5 MG Oral Tab Take 1 tablet (5 mg total) by mouth daily.  Disp:  Rfl: 0   aspirin EC 81 MG Oral Tab EC Take 1 tablet (81 m Pulse 68   Temp 97.5 °F (36.4 °C) (Temporal)   Resp 16   Ht 63.5\"   Wt 143 lb   SpO2 99%   BMI 24.93 kg/m²   GENERAL: well developed, well nourished,in no apparent distress  SKIN: no rashes,no suspicious lesions  LUNGS: clear to auscultation  CARDIO: RRR

## 2019-09-11 ENCOUNTER — TELEPHONE (OUTPATIENT)
Dept: FAMILY MEDICINE CLINIC | Facility: CLINIC | Age: 84
End: 2019-09-11

## 2019-10-12 DIAGNOSIS — I10 ESSENTIAL HYPERTENSION: ICD-10-CM

## 2019-10-12 RX ORDER — AMLODIPINE BESYLATE 5 MG/1
TABLET ORAL
Qty: 90 TABLET | Refills: 0 | Status: SHIPPED | OUTPATIENT
Start: 2019-10-12 | End: 2020-01-01

## 2019-10-12 RX ORDER — AMLODIPINE BESYLATE 5 MG/1
TABLET ORAL
Qty: 90 TABLET | Refills: 0 | Status: CANCELLED | OUTPATIENT
Start: 2019-10-12

## 2019-10-12 NOTE — TELEPHONE ENCOUNTER
Last refill: 10/23/2018 #90 with 3 refills  Last Visit: 9/09/2019  Next Visit: No future appointments. Forward to Dr. Carolina Ojeda (covering provider) please advise on refills. Thanks.

## 2019-10-21 ENCOUNTER — MED REC SCAN ONLY (OUTPATIENT)
Dept: FAMILY MEDICINE CLINIC | Facility: CLINIC | Age: 84
End: 2019-10-21

## 2019-11-04 ENCOUNTER — TELEPHONE (OUTPATIENT)
Dept: FAMILY MEDICINE CLINIC | Facility: CLINIC | Age: 84
End: 2019-11-04

## 2019-11-04 ENCOUNTER — NURSE ONLY (OUTPATIENT)
Dept: FAMILY MEDICINE CLINIC | Facility: CLINIC | Age: 84
End: 2019-11-04
Payer: MEDICARE

## 2019-11-04 ENCOUNTER — HOSPITAL ENCOUNTER (EMERGENCY)
Age: 84
Discharge: HOME | End: 2019-11-04
Attending: EMERGENCY MEDICINE
Payer: MEDICARE

## 2019-11-04 VITALS
WEIGHT: 143.06 LBS | OXYGEN SATURATION: 98 % | BODY MASS INDEX: 25 KG/M2 | DIASTOLIC BLOOD PRESSURE: 68 MMHG | TEMPERATURE: 100 F | SYSTOLIC BLOOD PRESSURE: 159 MMHG | HEART RATE: 69 BPM | RESPIRATION RATE: 16 BRPM

## 2019-11-04 VITALS — SYSTOLIC BLOOD PRESSURE: 160 MMHG | HEART RATE: 70 BPM | DIASTOLIC BLOOD PRESSURE: 78 MMHG

## 2019-11-04 DIAGNOSIS — Z23 NEED FOR VACCINATION: ICD-10-CM

## 2019-11-04 DIAGNOSIS — I15.9 SECONDARY HYPERTENSION: Primary | ICD-10-CM

## 2019-11-04 PROCEDURE — 80053 COMPREHEN METABOLIC PANEL: CPT | Performed by: EMERGENCY MEDICINE

## 2019-11-04 PROCEDURE — 85025 COMPLETE CBC W/AUTO DIFF WBC: CPT | Performed by: EMERGENCY MEDICINE

## 2019-11-04 PROCEDURE — 99285 EMERGENCY DEPT VISIT HI MDM: CPT

## 2019-11-04 PROCEDURE — G0008 ADMIN INFLUENZA VIRUS VAC: HCPCS | Performed by: FAMILY MEDICINE

## 2019-11-04 PROCEDURE — 90662 IIV NO PRSV INCREASED AG IM: CPT | Performed by: FAMILY MEDICINE

## 2019-11-04 PROCEDURE — 93010 ELECTROCARDIOGRAM REPORT: CPT

## 2019-11-04 PROCEDURE — 36415 COLL VENOUS BLD VENIPUNCTURE: CPT

## 2019-11-04 PROCEDURE — 93005 ELECTROCARDIOGRAM TRACING: CPT

## 2019-11-04 PROCEDURE — 99284 EMERGENCY DEPT VISIT MOD MDM: CPT

## 2019-11-04 RX ORDER — AMLODIPINE BESYLATE 5 MG/1
5 TABLET ORAL ONCE
Status: DISCONTINUED | OUTPATIENT
Start: 2019-11-04 | End: 2019-11-04

## 2019-11-04 NOTE — TELEPHONE ENCOUNTER
I'd try doubling the amlodipine, nurse visit in 2-3 weeks to recheck, bring home readings in (check 3 times in a row over about 15minutes and average 5days/week)

## 2019-11-04 NOTE — PROGRESS NOTES
Pt here for BP check- he states that his BP has been running high at home    sys- 160-170  dys-70-80      In the office the BP is still elevated  Pt takes 5mg amlodipine daily    Pt advised that this information will be forwarded to Dr. Omar Mukherjee and to expect

## 2019-11-04 NOTE — TELEPHONE ENCOUNTER
Pt here for BP check- he states that his BP has been running high at home    sys- 160-170  dys-70-80      In the office the BP is still elevated  Pt takes 5mg amlodipine daily    Pt advised that this information will be forwarded to Dr. Nahid Hensley and to expect

## 2019-11-05 ENCOUNTER — MED REC SCAN ONLY (OUTPATIENT)
Dept: FAMILY MEDICINE CLINIC | Facility: CLINIC | Age: 84
End: 2019-11-05

## 2019-11-05 ENCOUNTER — TELEPHONE (OUTPATIENT)
Dept: FAMILY MEDICINE CLINIC | Facility: CLINIC | Age: 84
End: 2019-11-05

## 2019-11-05 NOTE — ED PROVIDER NOTES
Patient Seen in: [facility] Emergency Department In [place]      History   Patient presents with:  Hypertension (cardiovascular)    Stated Complaint: HTN    HPI    55-year-old presents to the emergency department with elevated blood pressure.   He states t noted in HPI. Constitutional and vital signs reviewed. All other systems reviewed and negative except as noted above.     Physical Exam     ED Triage Vitals [11/04/19 1947]   BP (!) 198/88   Pulse 74   Resp 18   Temp 99.5 °F (37.5 °C)   Temp src Tempo minute, no acute ST changes to suggest ischemia, benign EKG last EKG showed more tachycardia this EKG is much more benign.               No orders to display          MDM     She presents to the emergency department with high blood pressure, he apparently d

## 2019-11-05 NOTE — TELEPHONE ENCOUNTER
Spoke with the pt and reiterated the instructions from Dr. Dinesh Almanza to double his amlodipine and check his BP 1-3 times a day (3 times in a 15min period and average the number) then make nurse visit in 2-3 weeks- he v/u      I explained to the pt that it is im

## 2019-11-05 NOTE — ED INITIAL ASSESSMENT (HPI)
Pt had ringing in his ears last noc. Checked his bp 198/88. Pt denies dizziness, headache or chest pain. Saw pcp's nurse today and had flu shot.

## 2019-11-05 NOTE — TELEPHONE ENCOUNTER
PT WENT  E [facility] BP. PT WAS D/C AND HAS A FEW QUESTIONS ON D/C PAPER WORK. WAS ADV TO CHECK BP EVERY 15MINS FOR THE NEXT 2 WEEKS.     PLEASE CALL AND ADV    THANK YOU

## 2019-11-12 ENCOUNTER — OFFICE VISIT (OUTPATIENT)
Dept: FAMILY MEDICINE CLINIC | Facility: CLINIC | Age: 84
End: 2019-11-12
Payer: MEDICARE

## 2019-11-12 VITALS
SYSTOLIC BLOOD PRESSURE: 180 MMHG | HEIGHT: 63.5 IN | HEART RATE: 76 BPM | BODY MASS INDEX: 25.31 KG/M2 | RESPIRATION RATE: 18 BRPM | WEIGHT: 144.63 LBS | TEMPERATURE: 98 F | DIASTOLIC BLOOD PRESSURE: 70 MMHG

## 2019-11-12 DIAGNOSIS — N28.9 RENAL INSUFFICIENCY: ICD-10-CM

## 2019-11-12 DIAGNOSIS — IMO0002 SOLITARY LEFT KIDNEY: ICD-10-CM

## 2019-11-12 DIAGNOSIS — I10 ESSENTIAL HYPERTENSION: Primary | ICD-10-CM

## 2019-11-12 PROCEDURE — 99214 OFFICE O/P EST MOD 30 MIN: CPT | Performed by: FAMILY MEDICINE

## 2019-11-12 NOTE — PROGRESS NOTES
Nieves Harding is a 80year old male. HPI:   Patient here to f/u on BP:    He doubled amlodipine to 10mg at once and it hasn't helped and home readings are 160s-180s/60s-70s. Since, then he feel a littl more tired.     Overall he's so happy with how he's CHOLECYSTECTOMY     • HERNIA SURGERY      inguinal hernia x2   • LAPAROSCOPY, SURGICAL PROSTATECTOMY, RETROPUBIC RADICAL, W/NERVE SPARING     • OTHER      bladder repair   • OTHER SURGICAL HISTORY      bladder cancer removal   • OTHER SURGICAL HISTORY

## 2019-11-14 RX ORDER — IRBESARTAN 150 MG/1
150 TABLET ORAL NIGHTLY
Qty: 90 TABLET | Refills: 0 | Status: SHIPPED | OUTPATIENT
Start: 2019-11-14 | End: 2020-01-01

## 2019-12-05 NOTE — TELEPHONE ENCOUNTER
Pt came in for blood work and also left some blood pressure readings. These have been placed in Dr. Jennifer rios for review. He states he increased amlodipine 5mg to BID for 3 days and Irbesartan 150mg- 1 tab nightly.  He states he started taking this when hi

## 2019-12-05 NOTE — PROGRESS NOTES
Patient presented for lab work ordered by Jasiel Meehan. One mint and one lavender tubes drawn with a straight needle X one attempt in right AC space. Pt tolerated procedure well and left in stable condition.

## 2019-12-06 NOTE — TELEPHONE ENCOUNTER
Spoke with the pt and advised of the instructions from Dr. Sherlyn Sanchez-  he is more stressed out than usual- he states that he is going to take a lorazepam daily for a few days to see if it makes a difference-     [address] made aware

## 2019-12-06 NOTE — TELEPHONE ENCOUNTER
If he's tolerating the amlodipine BID okay keep that up, readings averaging higher than I'd like. Is her more stressed out there, his BP tends to go up with stress? Time to get to a meditation class!     Get updated readings to me in 2 weeks (TID for5 day

## 2020-01-01 ENCOUNTER — TELEPHONE (OUTPATIENT)
Dept: FAMILY MEDICINE CLINIC | Facility: CLINIC | Age: 85
End: 2020-01-01

## 2020-01-01 ENCOUNTER — PATIENT OUTREACH (OUTPATIENT)
Dept: CASE MANAGEMENT | Age: 85
End: 2020-01-01

## 2020-01-01 ENCOUNTER — HOSPITAL ENCOUNTER (INPATIENT)
Facility: HOSPITAL | Age: 85
LOS: 5 days | Discharge: HOSPICE | DRG: 683 | End: 2020-01-01
Attending: EMERGENCY MEDICINE | Admitting: HOSPITALIST
Payer: MEDICARE

## 2020-01-01 ENCOUNTER — NURSE ONLY (OUTPATIENT)
Dept: FAMILY MEDICINE CLINIC | Facility: CLINIC | Age: 85
End: 2020-01-01
Payer: MEDICARE

## 2020-01-01 ENCOUNTER — TELEMEDICINE (OUTPATIENT)
Dept: FAMILY MEDICINE CLINIC | Facility: CLINIC | Age: 85
End: 2020-01-01
Payer: MEDICARE

## 2020-01-01 ENCOUNTER — APPOINTMENT (OUTPATIENT)
Dept: CT IMAGING | Age: 85
End: 2020-01-01
Attending: EMERGENCY MEDICINE
Payer: MEDICARE

## 2020-01-01 ENCOUNTER — PATIENT MESSAGE (OUTPATIENT)
Dept: FAMILY MEDICINE CLINIC | Facility: CLINIC | Age: 85
End: 2020-01-01

## 2020-01-01 ENCOUNTER — APPOINTMENT (OUTPATIENT)
Dept: ULTRASOUND IMAGING | Facility: HOSPITAL | Age: 85
DRG: 683 | End: 2020-01-01
Attending: HOSPITALIST
Payer: MEDICARE

## 2020-01-01 ENCOUNTER — LABORATORY ENCOUNTER (OUTPATIENT)
Dept: LAB | Age: 85
End: 2020-01-01
Attending: FAMILY MEDICINE
Payer: MEDICARE

## 2020-01-01 ENCOUNTER — APPOINTMENT (OUTPATIENT)
Dept: CV DIAGNOSTICS | Facility: HOSPITAL | Age: 85
End: 2020-01-01
Attending: HOSPITALIST
Payer: MEDICARE

## 2020-01-01 ENCOUNTER — OFFICE VISIT (OUTPATIENT)
Dept: FAMILY MEDICINE CLINIC | Facility: CLINIC | Age: 85
End: 2020-01-01
Payer: MEDICARE

## 2020-01-01 ENCOUNTER — MED REC SCAN ONLY (OUTPATIENT)
Dept: FAMILY MEDICINE CLINIC | Facility: CLINIC | Age: 85
End: 2020-01-01

## 2020-01-01 ENCOUNTER — APPOINTMENT (OUTPATIENT)
Dept: ULTRASOUND IMAGING | Facility: HOSPITAL | Age: 85
DRG: 683 | End: 2020-01-01
Attending: INTERNAL MEDICINE
Payer: MEDICARE

## 2020-01-01 ENCOUNTER — APPOINTMENT (OUTPATIENT)
Dept: MRI IMAGING | Facility: HOSPITAL | Age: 85
DRG: 683 | End: 2020-01-01
Attending: NURSE PRACTITIONER
Payer: MEDICARE

## 2020-01-01 ENCOUNTER — TELEPHONE (OUTPATIENT)
Dept: HEMATOLOGY/ONCOLOGY | Facility: HOSPITAL | Age: 85
End: 2020-01-01

## 2020-01-01 ENCOUNTER — HOSPITAL ENCOUNTER (OUTPATIENT)
Facility: HOSPITAL | Age: 85
Setting detail: OBSERVATION
Discharge: HOME | End: 2020-01-01
Attending: EMERGENCY MEDICINE | Admitting: HOSPITALIST
Payer: MEDICARE

## 2020-01-01 ENCOUNTER — HOSPITAL ENCOUNTER (OUTPATIENT)
Dept: MRI IMAGING | Facility: HOSPITAL | Age: 85
Discharge: HOME | End: 2020-01-01
Attending: NURSE PRACTITIONER
Payer: MEDICARE

## 2020-01-01 ENCOUNTER — IMMUNIZATION (OUTPATIENT)
Dept: FAMILY MEDICINE CLINIC | Facility: CLINIC | Age: 85
End: 2020-01-01
Payer: MEDICARE

## 2020-01-01 ENCOUNTER — APPOINTMENT (OUTPATIENT)
Dept: GENERAL RADIOLOGY | Facility: HOSPITAL | Age: 85
DRG: 683 | End: 2020-01-01
Attending: EMERGENCY MEDICINE
Payer: MEDICARE

## 2020-01-01 ENCOUNTER — HOSPITAL ENCOUNTER (OUTPATIENT)
Dept: GENERAL RADIOLOGY | Facility: HOSPITAL | Age: 85
Discharge: HOME | End: 2020-01-01
Attending: NURSE PRACTITIONER
Payer: MEDICARE

## 2020-01-01 ENCOUNTER — APPOINTMENT (OUTPATIENT)
Dept: MRI IMAGING | Facility: HOSPITAL | Age: 85
End: 2020-01-01
Attending: HOSPITALIST
Payer: MEDICARE

## 2020-01-01 ENCOUNTER — APPOINTMENT (OUTPATIENT)
Dept: NUCLEAR MEDICINE | Facility: HOSPITAL | Age: 85
End: 2020-01-01
Attending: EMERGENCY MEDICINE
Payer: MEDICARE

## 2020-01-01 ENCOUNTER — APPOINTMENT (OUTPATIENT)
Dept: ULTRASOUND IMAGING | Facility: HOSPITAL | Age: 85
End: 2020-01-01
Attending: HOSPITALIST
Payer: MEDICARE

## 2020-01-01 ENCOUNTER — APPOINTMENT (OUTPATIENT)
Dept: GENERAL RADIOLOGY | Age: 85
End: 2020-01-01
Attending: EMERGENCY MEDICINE
Payer: MEDICARE

## 2020-01-01 ENCOUNTER — TELEPHONE (OUTPATIENT)
Dept: HEMATOLOGY/ONCOLOGY | Age: 85
End: 2020-01-01

## 2020-01-01 ENCOUNTER — HOSPITAL ENCOUNTER (OUTPATIENT)
Dept: ULTRASOUND IMAGING | Facility: HOSPITAL | Age: 85
Discharge: HOME | End: 2020-01-01
Attending: NURSE PRACTITIONER
Payer: MEDICARE

## 2020-01-01 VITALS
HEART RATE: 74 BPM | WEIGHT: 147.63 LBS | BODY MASS INDEX: 25.83 KG/M2 | HEIGHT: 63.5 IN | RESPIRATION RATE: 14 BRPM | DIASTOLIC BLOOD PRESSURE: 64 MMHG | TEMPERATURE: 98 F | SYSTOLIC BLOOD PRESSURE: 160 MMHG

## 2020-01-01 VITALS
SYSTOLIC BLOOD PRESSURE: 140 MMHG | HEIGHT: 65 IN | TEMPERATURE: 98 F | OXYGEN SATURATION: 95 % | BODY MASS INDEX: 24.32 KG/M2 | WEIGHT: 146 LBS | RESPIRATION RATE: 14 BRPM | DIASTOLIC BLOOD PRESSURE: 50 MMHG | HEART RATE: 72 BPM

## 2020-01-01 VITALS
TEMPERATURE: 98 F | DIASTOLIC BLOOD PRESSURE: 58 MMHG | SYSTOLIC BLOOD PRESSURE: 141 MMHG | OXYGEN SATURATION: 98 % | WEIGHT: 135.63 LBS | RESPIRATION RATE: 18 BRPM | HEART RATE: 80 BPM | BODY MASS INDEX: 23 KG/M2

## 2020-01-01 VITALS
SYSTOLIC BLOOD PRESSURE: 125 MMHG | HEART RATE: 81 BPM | RESPIRATION RATE: 18 BRPM | DIASTOLIC BLOOD PRESSURE: 41 MMHG | BODY MASS INDEX: 24.69 KG/M2 | WEIGHT: 148.19 LBS | OXYGEN SATURATION: 93 % | HEIGHT: 65 IN | TEMPERATURE: 98 F

## 2020-01-01 VITALS
TEMPERATURE: 98 F | DIASTOLIC BLOOD PRESSURE: 60 MMHG | WEIGHT: 145.63 LBS | RESPIRATION RATE: 16 BRPM | BODY MASS INDEX: 24 KG/M2 | SYSTOLIC BLOOD PRESSURE: 170 MMHG | HEART RATE: 84 BPM

## 2020-01-01 VITALS — DIASTOLIC BLOOD PRESSURE: 64 MMHG | SYSTOLIC BLOOD PRESSURE: 160 MMHG

## 2020-01-01 DIAGNOSIS — C65.1 CARCINOMA OF RENAL PELVIS, RIGHT (HCC): ICD-10-CM

## 2020-01-01 DIAGNOSIS — N28.9 RENAL INSUFFICIENCY: Primary | ICD-10-CM

## 2020-01-01 DIAGNOSIS — R26.81 UNSTEADY GAIT: ICD-10-CM

## 2020-01-01 DIAGNOSIS — N52.1 ERECTILE DYSFUNCTION DUE TO DISEASES CLASSIFIED ELSEWHERE: ICD-10-CM

## 2020-01-01 DIAGNOSIS — R79.9 ELEVATED BUN: ICD-10-CM

## 2020-01-01 DIAGNOSIS — Z97.3 WEARS GLASSES: ICD-10-CM

## 2020-01-01 DIAGNOSIS — I10 ESSENTIAL HYPERTENSION: ICD-10-CM

## 2020-01-01 DIAGNOSIS — C68.9 UROTHELIAL CANCER (HCC): ICD-10-CM

## 2020-01-01 DIAGNOSIS — C67.2 MALIGNANT NEOPLASM OF LATERAL WALL OF URINARY BLADDER (HCC): ICD-10-CM

## 2020-01-01 DIAGNOSIS — R35.0 BENIGN PROSTATIC HYPERPLASIA WITH URINARY FREQUENCY: ICD-10-CM

## 2020-01-01 DIAGNOSIS — G47.33 OSA (OBSTRUCTIVE SLEEP APNEA): ICD-10-CM

## 2020-01-01 DIAGNOSIS — IMO0002 SOLITARY LEFT KIDNEY: ICD-10-CM

## 2020-01-01 DIAGNOSIS — N40.1 BENIGN PROSTATIC HYPERPLASIA WITH URINARY FREQUENCY: ICD-10-CM

## 2020-01-01 DIAGNOSIS — R79.89 ELEVATED SERUM CREATININE: ICD-10-CM

## 2020-01-01 DIAGNOSIS — R79.89 ELEVATED TSH: Primary | ICD-10-CM

## 2020-01-01 DIAGNOSIS — Z02.9 ENCOUNTERS FOR UNSPECIFIED ADMINISTRATIVE PURPOSE: ICD-10-CM

## 2020-01-01 DIAGNOSIS — R73.9 ELEVATED BLOOD SUGAR: Primary | ICD-10-CM

## 2020-01-01 DIAGNOSIS — IMO0002 SOLITARY LEFT KIDNEY: Primary | ICD-10-CM

## 2020-01-01 DIAGNOSIS — I10 WHITE COAT SYNDROME WITH DIAGNOSIS OF HYPERTENSION: ICD-10-CM

## 2020-01-01 DIAGNOSIS — R68.83 CHILLS: ICD-10-CM

## 2020-01-01 DIAGNOSIS — N18.30 CHRONIC KIDNEY DISEASE, STAGE III (MODERATE) (HCC): ICD-10-CM

## 2020-01-01 DIAGNOSIS — Z97.4 WEARS HEARING AID: ICD-10-CM

## 2020-01-01 DIAGNOSIS — E87.5 SERUM POTASSIUM ELEVATED: ICD-10-CM

## 2020-01-01 DIAGNOSIS — N30.01 ACUTE CYSTITIS WITH HEMATURIA: ICD-10-CM

## 2020-01-01 DIAGNOSIS — D63.1 ANEMIA DUE TO CHRONIC KIDNEY DISEASE, UNSPECIFIED CKD STAGE: ICD-10-CM

## 2020-01-01 DIAGNOSIS — R31.0 GROSS HEMATURIA: ICD-10-CM

## 2020-01-01 DIAGNOSIS — J30.89 SEASONAL ALLERGIC RHINITIS DUE TO OTHER ALLERGIC TRIGGER: ICD-10-CM

## 2020-01-01 DIAGNOSIS — E87.5 HYPERKALEMIA: ICD-10-CM

## 2020-01-01 DIAGNOSIS — N18.9 ANEMIA DUE TO CHRONIC KIDNEY DISEASE, UNSPECIFIED CKD STAGE: ICD-10-CM

## 2020-01-01 DIAGNOSIS — N17.9 ACUTE RENAL FAILURE, UNSPECIFIED ACUTE RENAL FAILURE TYPE (HCC): ICD-10-CM

## 2020-01-01 DIAGNOSIS — K86.2 PANCREATIC CYST: ICD-10-CM

## 2020-01-01 DIAGNOSIS — Z93.6 OTHER ARTIFICIAL OPENINGS OF URINARY TRACT STATUS (HCC): ICD-10-CM

## 2020-01-01 DIAGNOSIS — R97.20 ELEVATED PROSTATE SPECIFIC ANTIGEN (PSA): ICD-10-CM

## 2020-01-01 DIAGNOSIS — I10 ESSENTIAL HYPERTENSION: Primary | ICD-10-CM

## 2020-01-01 DIAGNOSIS — N28.9 RENAL INSUFFICIENCY: ICD-10-CM

## 2020-01-01 DIAGNOSIS — Z86.718 HISTORY OF DVT (DEEP VEIN THROMBOSIS): ICD-10-CM

## 2020-01-01 DIAGNOSIS — Z86.19 HISTORY OF CLOSTRIDIUM DIFFICILE COLITIS: ICD-10-CM

## 2020-01-01 DIAGNOSIS — R07.9 CHEST PAIN OF UNCERTAIN ETIOLOGY: Primary | ICD-10-CM

## 2020-01-01 DIAGNOSIS — N18.30 STAGE 3 CHRONIC KIDNEY DISEASE, UNSPECIFIED WHETHER STAGE 3A OR 3B CKD (HCC): ICD-10-CM

## 2020-01-01 DIAGNOSIS — R07.9 CHEST PAIN OF UNCERTAIN ETIOLOGY: ICD-10-CM

## 2020-01-01 DIAGNOSIS — R79.9 ELEVATED BUN: Primary | ICD-10-CM

## 2020-01-01 DIAGNOSIS — R73.9 ELEVATED BLOOD SUGAR: ICD-10-CM

## 2020-01-01 DIAGNOSIS — Z23 NEED FOR VACCINATION: ICD-10-CM

## 2020-01-01 DIAGNOSIS — R42 DIZZINESS: ICD-10-CM

## 2020-01-01 DIAGNOSIS — Z09 FOLLOW-UP EXAM: Primary | ICD-10-CM

## 2020-01-01 DIAGNOSIS — R91.8 LUNG NODULES: ICD-10-CM

## 2020-01-01 DIAGNOSIS — Z00.00 ENCOUNTER FOR ANNUAL HEALTH EXAMINATION: Primary | ICD-10-CM

## 2020-01-01 DIAGNOSIS — E87.1 HYPONATREMIA: Primary | ICD-10-CM

## 2020-01-01 DIAGNOSIS — R42 DIZZINESS: Primary | ICD-10-CM

## 2020-01-01 LAB
ALBUMIN SERPL-MCNC: 3.2 G/DL (ref 3.4–5)
ALBUMIN SERPL-MCNC: 4 G/DL (ref 3.4–5)
ALBUMIN SERPL-MCNC: 4.1 G/DL (ref 3.4–5)
ALBUMIN/GLOB SERPL: 0.9 {RATIO} (ref 1–2)
ALBUMIN/GLOB SERPL: 1 {RATIO} (ref 1–2)
ALBUMIN/GLOB SERPL: 1.1 {RATIO} (ref 1–2)
ALP LIVER SERPL-CCNC: 140 U/L
ALP LIVER SERPL-CCNC: 152 U/L
ALP LIVER SERPL-CCNC: 81 U/L (ref 45–117)
ALT SERPL-CCNC: 31 U/L (ref 16–61)
ALT SERPL-CCNC: 83 U/L
ALT SERPL-CCNC: 92 U/L
ANION GAP SERPL CALC-SCNC: 5 MMOL/L (ref 0–18)
ANION GAP SERPL CALC-SCNC: 8 MMOL/L (ref 0–18)
APTT PPP: 30 SECONDS (ref 25.4–36.1)
AST SERPL-CCNC: 20 U/L (ref 15–37)
AST SERPL-CCNC: 64 U/L (ref 15–37)
AST SERPL-CCNC: 67 U/L (ref 15–37)
ATRIAL RATE: 101 BPM
ATRIAL RATE: 106 BPM
ATRIAL RATE: 67 BPM
ATRIAL RATE: 73 BPM
BASOPHILS # BLD AUTO: 0.03 X10(3) UL (ref 0–0.2)
BASOPHILS # BLD AUTO: 0.04 X10(3) UL (ref 0–0.2)
BASOPHILS NFR BLD AUTO: 0.5 %
BASOPHILS NFR BLD AUTO: 0.6 %
BASOPHILS NFR BLD AUTO: 0.7 %
BASOPHILS NFR BLD AUTO: 0.8 %
BILIRUB SERPL-MCNC: 0.6 MG/DL (ref 0.1–2)
BILIRUB SERPL-MCNC: 0.6 MG/DL (ref 0.1–2)
BILIRUB SERPL-MCNC: 0.7 MG/DL (ref 0.1–2)
BILIRUB UR QL STRIP.AUTO: NEGATIVE
BUN BLD-MCNC: 35 MG/DL (ref 7–18)
BUN BLD-MCNC: 37 MG/DL (ref 7–18)
BUN BLD-MCNC: 39 MG/DL (ref 7–18)
BUN BLD-MCNC: 45 MG/DL (ref 7–18)
BUN/CREAT SERPL: 18.8 (ref 10–20)
BUN/CREAT SERPL: 18.9 (ref 10–20)
BUN/CREAT SERPL: 20.6 (ref 10–20)
BUN/CREAT SERPL: 21 (ref 10–20)
CALCIUM BLD-MCNC: 8.4 MG/DL (ref 8.5–10.1)
CALCIUM BLD-MCNC: 9 MG/DL (ref 8.5–10.1)
CALCIUM BLD-MCNC: 9.3 MG/DL (ref 8.5–10.1)
CALCIUM BLD-MCNC: 9.6 MG/DL (ref 8.5–10.1)
CHLORIDE SERPL-SCNC: 109 MMOL/L (ref 98–112)
CHLORIDE SERPL-SCNC: 110 MMOL/L (ref 98–112)
CHLORIDE SERPL-SCNC: 112 MMOL/L (ref 98–112)
CHLORIDE SERPL-SCNC: 114 MMOL/L (ref 98–112)
CHOLEST SMN-MCNC: 113 MG/DL (ref ?–200)
CHOLEST SMN-MCNC: 129 MG/DL (ref ?–200)
CLARITY UR REFRACT.AUTO: CLEAR
CO2 SERPL-SCNC: 19 MMOL/L (ref 21–32)
CO2 SERPL-SCNC: 21 MMOL/L (ref 21–32)
CO2 SERPL-SCNC: 22 MMOL/L (ref 21–32)
CO2 SERPL-SCNC: 24 MMOL/L (ref 21–32)
COLOR UR AUTO: YELLOW
CREAT BLD-MCNC: 1.85 MG/DL (ref 0.7–1.3)
CREAT BLD-MCNC: 1.89 MG/DL
CREAT BLD-MCNC: 1.97 MG/DL (ref 0.7–1.3)
CREAT BLD-MCNC: 2.1 MG/DL (ref 0.7–1.3)
CREAT BLD-MCNC: 2.14 MG/DL
D-DIMER: 2.26 UG/ML FEU (ref ?–0.86)
DEPRECATED RDW RBC AUTO: 45.7 FL (ref 35.1–46.3)
DEPRECATED RDW RBC AUTO: 47.3 FL (ref 35.1–46.3)
DEPRECATED RDW RBC AUTO: 47.4 FL (ref 35.1–46.3)
DEPRECATED RDW RBC AUTO: 47.5 FL (ref 35.1–46.3)
EOSINOPHIL # BLD AUTO: 0.14 X10(3) UL (ref 0–0.7)
EOSINOPHIL # BLD AUTO: 0.22 X10(3) UL (ref 0–0.7)
EOSINOPHIL # BLD AUTO: 0.32 X10(3) UL (ref 0–0.7)
EOSINOPHIL # BLD AUTO: 0.35 X10(3) UL (ref 0–0.7)
EOSINOPHIL NFR BLD AUTO: 2.2 %
EOSINOPHIL NFR BLD AUTO: 3.9 %
EOSINOPHIL NFR BLD AUTO: 7.1 %
EOSINOPHIL NFR BLD AUTO: 9 %
ERYTHROCYTE [DISTWIDTH] IN BLOOD BY AUTOMATED COUNT: 13.2 % (ref 11–15)
ERYTHROCYTE [DISTWIDTH] IN BLOOD BY AUTOMATED COUNT: 13.6 % (ref 11–15)
ERYTHROCYTE [DISTWIDTH] IN BLOOD BY AUTOMATED COUNT: 13.9 % (ref 11–15)
ERYTHROCYTE [DISTWIDTH] IN BLOOD BY AUTOMATED COUNT: 14.1 % (ref 11–15)
EST. AVERAGE GLUCOSE BLD GHB EST-MCNC: 123 MG/DL (ref 68–126)
GLOBULIN PLAS-MCNC: 3.4 G/DL (ref 2.8–4.4)
GLOBULIN PLAS-MCNC: 3.9 G/DL (ref 2.8–4.4)
GLOBULIN PLAS-MCNC: 4 G/DL (ref 2.8–4.4)
GLUCOSE BLD-MCNC: 114 MG/DL (ref 70–99)
GLUCOSE BLD-MCNC: 118 MG/DL (ref 70–99)
GLUCOSE BLD-MCNC: 129 MG/DL (ref 70–99)
GLUCOSE BLD-MCNC: 85 MG/DL (ref 70–99)
GLUCOSE BLD-MCNC: 94 MG/DL (ref 70–99)
GLUCOSE BLD-MCNC: 95 MG/DL (ref 70–99)
GLUCOSE BLD-MCNC: 98 MG/DL (ref 70–99)
GLUCOSE UR STRIP.AUTO-MCNC: NEGATIVE MG/DL
HAV IGM SER QL: 2 MG/DL (ref 1.6–2.6)
HAV IGM SER QL: NONREACTIVE
HBA1C MFR BLD HPLC: 5.9 % (ref ?–5.7)
HBV CORE IGM SER QL: NONREACTIVE
HBV SURFACE AG SERPL QL IA: NONREACTIVE
HCT VFR BLD AUTO: 30.8 % (ref 39–53)
HCT VFR BLD AUTO: 33.7 %
HCT VFR BLD AUTO: 34.6 % (ref 39–53)
HCT VFR BLD AUTO: 37.6 %
HCV AB SERPL QL IA: NONREACTIVE
HDLC SERPL-MCNC: 49 MG/DL (ref 40–59)
HDLC SERPL-MCNC: 54 MG/DL (ref 40–59)
HGB BLD-MCNC: 10.3 G/DL (ref 13–17.5)
HGB BLD-MCNC: 10.9 G/DL
HGB BLD-MCNC: 11.7 G/DL (ref 13–17.5)
HGB BLD-MCNC: 12.3 G/DL
IMM GRANULOCYTES # BLD AUTO: 0 X10(3) UL (ref 0–1)
IMM GRANULOCYTES # BLD AUTO: 0.01 X10(3) UL (ref 0–1)
IMM GRANULOCYTES # BLD AUTO: 0.03 X10(3) UL (ref 0–1)
IMM GRANULOCYTES # BLD AUTO: 0.05 X10(3) UL (ref 0–1)
IMM GRANULOCYTES NFR BLD: 0 %
IMM GRANULOCYTES NFR BLD: 0.2 %
IMM GRANULOCYTES NFR BLD: 0.5 %
IMM GRANULOCYTES NFR BLD: 0.8 %
INR BLD: 0.94 (ref 0.9–1.1)
KETONES UR STRIP.AUTO-MCNC: NEGATIVE MG/DL
LDLC SERPL CALC-MCNC: 51 MG/DL (ref ?–100)
LDLC SERPL CALC-MCNC: 60 MG/DL (ref ?–100)
LIPASE SERPL-CCNC: 179 U/L (ref 73–393)
LYMPHOCYTES # BLD AUTO: 0.83 X10(3) UL (ref 1–4)
LYMPHOCYTES # BLD AUTO: 0.86 X10(3) UL (ref 1–4)
LYMPHOCYTES # BLD AUTO: 0.93 X10(3) UL (ref 1–4)
LYMPHOCYTES # BLD AUTO: 0.99 X10(3) UL (ref 1–4)
LYMPHOCYTES NFR BLD AUTO: 13.6 %
LYMPHOCYTES NFR BLD AUTO: 14.8 %
LYMPHOCYTES NFR BLD AUTO: 20 %
LYMPHOCYTES NFR BLD AUTO: 26.2 %
M PROTEIN MFR SERPL ELPH: 6.6 G/DL (ref 6.4–8.2)
M PROTEIN MFR SERPL ELPH: 8 G/DL (ref 6.4–8.2)
M PROTEIN MFR SERPL ELPH: 8 G/DL (ref 6.4–8.2)
MCH RBC QN AUTO: 30.4 PG (ref 26–34)
MCH RBC QN AUTO: 30.6 PG (ref 26–34)
MCH RBC QN AUTO: 30.8 PG (ref 26–34)
MCH RBC QN AUTO: 30.9 PG (ref 26–34)
MCHC RBC AUTO-ENTMCNC: 32.3 G/DL (ref 31–37)
MCHC RBC AUTO-ENTMCNC: 32.7 G/DL (ref 31–37)
MCHC RBC AUTO-ENTMCNC: 33.4 G/DL (ref 31–37)
MCHC RBC AUTO-ENTMCNC: 33.8 G/DL (ref 31–37)
MCV RBC AUTO: 90.6 FL (ref 80–100)
MCV RBC AUTO: 92.5 FL (ref 80–100)
MCV RBC AUTO: 93.9 FL
MCV RBC AUTO: 94.2 FL
MONOCYTES # BLD AUTO: 0.34 X10(3) UL (ref 0.1–1)
MONOCYTES # BLD AUTO: 0.51 X10(3) UL (ref 0.1–1)
MONOCYTES # BLD AUTO: 0.56 X10(3) UL (ref 0.1–1)
MONOCYTES # BLD AUTO: 0.64 X10(3) UL (ref 0.1–1)
MONOCYTES NFR BLD AUTO: 10 %
MONOCYTES NFR BLD AUTO: 10.1 %
MONOCYTES NFR BLD AUTO: 10.3 %
MONOCYTES NFR BLD AUTO: 9.6 %
NEUTROPHILS # BLD AUTO: 1.93 X10 (3) UL (ref 1.5–7.7)
NEUTROPHILS # BLD AUTO: 1.93 X10(3) UL (ref 1.5–7.7)
NEUTROPHILS # BLD AUTO: 3.06 X10 (3) UL (ref 1.5–7.7)
NEUTROPHILS # BLD AUTO: 3.06 X10(3) UL (ref 1.5–7.7)
NEUTROPHILS # BLD AUTO: 3.91 X10 (3) UL (ref 1.5–7.7)
NEUTROPHILS # BLD AUTO: 3.91 X10(3) UL (ref 1.5–7.7)
NEUTROPHILS # BLD AUTO: 4.6 X10 (3) UL (ref 1.5–7.7)
NEUTROPHILS # BLD AUTO: 4.6 X10(3) UL (ref 1.5–7.7)
NEUTROPHILS NFR BLD AUTO: 54.4 %
NEUTROPHILS NFR BLD AUTO: 61.8 %
NEUTROPHILS NFR BLD AUTO: 70.1 %
NEUTROPHILS NFR BLD AUTO: 72.8 %
NITRITE UR QL STRIP.AUTO: NEGATIVE
NONHDLC SERPL-MCNC: 64 MG/DL (ref ?–130)
NONHDLC SERPL-MCNC: 75 MG/DL (ref ?–130)
NT-PROBNP SERPL-MCNC: 275 PG/ML (ref ?–450)
OSMOLALITY SERPL CALC.SUM OF ELEC: 297 MOSM/KG (ref 275–295)
OSMOLALITY SERPL CALC.SUM OF ELEC: 298 MOSM/KG (ref 275–295)
OSMOLALITY SERPL CALC.SUM OF ELEC: 300 MOSM/KG (ref 275–295)
OSMOLALITY SERPL CALC.SUM OF ELEC: 304 MOSM/KG (ref 275–295)
P AXIS: 48 DEGREES
P AXIS: 56 DEGREES
P AXIS: 72 DEGREES
P AXIS: 72 DEGREES
P-R INTERVAL: 162 MS
P-R INTERVAL: 166 MS
P-R INTERVAL: 166 MS
P-R INTERVAL: 182 MS
PH UR STRIP.AUTO: 5 [PH] (ref 4.5–8)
PLATELET # BLD AUTO: 220 10(3)UL (ref 150–450)
PLATELET # BLD AUTO: 245 10(3)UL (ref 150–450)
PLATELET # BLD AUTO: 247 10(3)UL (ref 150–450)
PLATELET # BLD AUTO: 262 10(3)UL (ref 150–450)
POTASSIUM SERPL-SCNC: 4.4 MMOL/L (ref 3.5–5.1)
POTASSIUM SERPL-SCNC: 4.6 MMOL/L (ref 3.5–5.1)
POTASSIUM SERPL-SCNC: 5 MMOL/L (ref 3.5–5.1)
POTASSIUM SERPL-SCNC: 5.4 MMOL/L (ref 3.5–5.1)
PROT UR STRIP.AUTO-MCNC: NEGATIVE MG/DL
PSA SERPL DL<=0.01 NG/ML-MCNC: 12.6 SECONDS (ref 12.2–14.4)
Q-T INTERVAL: 336 MS
Q-T INTERVAL: 336 MS
Q-T INTERVAL: 382 MS
Q-T INTERVAL: 390 MS
QRS DURATION: 86 MS
QRS DURATION: 88 MS
QRS DURATION: 88 MS
QRS DURATION: 90 MS
QTC CALCULATION (BEZET): 412 MS
QTC CALCULATION (BEZET): 420 MS
QTC CALCULATION (BEZET): 435 MS
QTC CALCULATION (BEZET): 446 MS
R AXIS: -40 DEGREES
R AXIS: -48 DEGREES
R AXIS: -51 DEGREES
R AXIS: -53 DEGREES
RBC # BLD AUTO: 3.33 X10(6)UL (ref 3.8–5.8)
RBC # BLD AUTO: 3.59 X10(6)UL
RBC # BLD AUTO: 3.82 X10(6)UL (ref 3.8–5.8)
RBC # BLD AUTO: 3.99 X10(6)UL
SARS-COV-2 RNA RESP QL NAA+PROBE: NOT DETECTED
SODIUM SERPL-SCNC: 139 MMOL/L (ref 136–145)
SODIUM SERPL-SCNC: 143 MMOL/L (ref 136–145)
SP GR UR STRIP.AUTO: <=1.005 (ref 1–1.03)
T AXIS: 21 DEGREES
T AXIS: 40 DEGREES
T AXIS: 43 DEGREES
T AXIS: 46 DEGREES
TRIGL SERPL-MCNC: 66 MG/DL (ref 30–149)
TRIGL SERPL-MCNC: 77 MG/DL (ref 30–149)
TROPONIN I SERPL-MCNC: <0.045 NG/ML (ref ?–0.04)
UROBILINOGEN UR STRIP.AUTO-MCNC: 0.2 MG/DL
VENTRICULAR RATE: 101 BPM
VENTRICULAR RATE: 106 BPM
VENTRICULAR RATE: 67 BPM
VENTRICULAR RATE: 73 BPM
VLDLC SERPL CALC-MCNC: 13 MG/DL (ref 0–30)
VLDLC SERPL CALC-MCNC: 15 MG/DL (ref 0–30)
WBC # BLD AUTO: 3.6 X10(3) UL (ref 4–11)
WBC # BLD AUTO: 5 X10(3) UL (ref 4–11)
WBC # BLD AUTO: 5.6 X10(3) UL (ref 4–11)
WBC # BLD AUTO: 6.3 X10(3) UL (ref 4–11)

## 2020-01-01 PROCEDURE — 84439 ASSAY OF FREE THYROXINE: CPT | Performed by: FAMILY MEDICINE

## 2020-01-01 PROCEDURE — 99232 SBSQ HOSP IP/OBS MODERATE 35: CPT | Performed by: INTERNAL MEDICINE

## 2020-01-01 PROCEDURE — 72195 MRI PELVIS W/O DYE: CPT | Performed by: NURSE PRACTITIONER

## 2020-01-01 PROCEDURE — 85025 COMPLETE CBC W/AUTO DIFF WBC: CPT

## 2020-01-01 PROCEDURE — 1111F DSCHRG MED/CURRENT MED MERGE: CPT

## 2020-01-01 PROCEDURE — 99223 1ST HOSP IP/OBS HIGH 75: CPT | Performed by: INTERNAL MEDICINE

## 2020-01-01 PROCEDURE — 80053 COMPREHEN METABOLIC PANEL: CPT

## 2020-01-01 PROCEDURE — 36415 COLL VENOUS BLD VENIPUNCTURE: CPT

## 2020-01-01 PROCEDURE — 99204 OFFICE O/P NEW MOD 45 MIN: CPT | Performed by: OTHER

## 2020-01-01 PROCEDURE — 74181 MRI ABDOMEN W/O CONTRAST: CPT | Performed by: NURSE PRACTITIONER

## 2020-01-01 PROCEDURE — G2058 CCM ADD 20MIN: HCPCS

## 2020-01-01 PROCEDURE — 99490 CHRNC CARE MGMT STAFF 1ST 20: CPT

## 2020-01-01 PROCEDURE — 99232 SBSQ HOSP IP/OBS MODERATE 35: CPT | Performed by: HOSPITALIST

## 2020-01-01 PROCEDURE — 71045 X-RAY EXAM CHEST 1 VIEW: CPT | Performed by: EMERGENCY MEDICINE

## 2020-01-01 PROCEDURE — 90662 IIV NO PRSV INCREASED AG IM: CPT | Performed by: FAMILY MEDICINE

## 2020-01-01 PROCEDURE — 87086 URINE CULTURE/COLONY COUNT: CPT | Performed by: FAMILY MEDICINE

## 2020-01-01 PROCEDURE — 99214 OFFICE O/P EST MOD 30 MIN: CPT | Performed by: FAMILY MEDICINE

## 2020-01-01 PROCEDURE — 99233 SBSQ HOSP IP/OBS HIGH 50: CPT | Performed by: INTERNAL MEDICINE

## 2020-01-01 PROCEDURE — 93970 EXTREMITY STUDY: CPT | Performed by: INTERNAL MEDICINE

## 2020-01-01 PROCEDURE — 1111F DSCHRG MED/CURRENT MED MERGE: CPT | Performed by: FAMILY MEDICINE

## 2020-01-01 PROCEDURE — 93975 VASCULAR STUDY: CPT | Performed by: HOSPITALIST

## 2020-01-01 PROCEDURE — G0439 PPPS, SUBSEQ VISIT: HCPCS | Performed by: FAMILY MEDICINE

## 2020-01-01 PROCEDURE — 70553 MRI BRAIN STEM W/O & W/DYE: CPT | Performed by: HOSPITALIST

## 2020-01-01 PROCEDURE — 81001 URINALYSIS AUTO W/SCOPE: CPT | Performed by: FAMILY MEDICINE

## 2020-01-01 PROCEDURE — G0008 ADMIN INFLUENZA VIRUS VAC: HCPCS | Performed by: FAMILY MEDICINE

## 2020-01-01 PROCEDURE — 99239 HOSP IP/OBS DSCHRG MGMT >30: CPT | Performed by: HOSPITALIST

## 2020-01-01 PROCEDURE — 70450 CT HEAD/BRAIN W/O DYE: CPT | Performed by: EMERGENCY MEDICINE

## 2020-01-01 PROCEDURE — 76376 3D RENDER W/INTRP POSTPROCES: CPT | Performed by: NURSE PRACTITIONER

## 2020-01-01 PROCEDURE — 99220 INITIAL OBSERVATION CARE,LEVL III: CPT | Performed by: HOSPITALIST

## 2020-01-01 PROCEDURE — 84443 ASSAY THYROID STIM HORMONE: CPT | Performed by: FAMILY MEDICINE

## 2020-01-01 PROCEDURE — 93880 EXTRACRANIAL BILAT STUDY: CPT | Performed by: HOSPITALIST

## 2020-01-01 PROCEDURE — 99215 OFFICE O/P EST HI 40 MIN: CPT | Performed by: FAMILY MEDICINE

## 2020-01-01 PROCEDURE — 83036 HEMOGLOBIN GLYCOSYLATED A1C: CPT

## 2020-01-01 PROCEDURE — 93306 TTE W/DOPPLER COMPLETE: CPT | Performed by: HOSPITALIST

## 2020-01-01 PROCEDURE — 80048 BASIC METABOLIC PNL TOTAL CA: CPT

## 2020-01-01 PROCEDURE — 78580 LUNG PERFUSION IMAGING: CPT | Performed by: EMERGENCY MEDICINE

## 2020-01-01 PROCEDURE — 82565 ASSAY OF CREATININE: CPT

## 2020-01-01 PROCEDURE — 76770 US EXAM ABDO BACK WALL COMP: CPT | Performed by: INTERNAL MEDICINE

## 2020-01-01 PROCEDURE — 99217 OBSERVATION CARE DISCHARGE: CPT | Performed by: HOSPITALIST

## 2020-01-01 PROCEDURE — 76775 US EXAM ABDO BACK WALL LIM: CPT | Performed by: NURSE PRACTITIONER

## 2020-01-01 PROCEDURE — 71046 X-RAY EXAM CHEST 2 VIEWS: CPT | Performed by: NURSE PRACTITIONER

## 2020-01-01 PROCEDURE — 80048 BASIC METABOLIC PNL TOTAL CA: CPT | Performed by: FAMILY MEDICINE

## 2020-01-01 PROCEDURE — 85025 COMPLETE CBC W/AUTO DIFF WBC: CPT | Performed by: FAMILY MEDICINE

## 2020-01-01 PROCEDURE — 84480 ASSAY TRIIODOTHYRONINE (T3): CPT | Performed by: FAMILY MEDICINE

## 2020-01-01 RX ORDER — ASPIRIN 81 MG/1
81 TABLET ORAL DAILY
Status: DISCONTINUED | OUTPATIENT
Start: 2020-01-01 | End: 2020-01-01

## 2020-01-01 RX ORDER — ATORVASTATIN CALCIUM 80 MG/1
80 TABLET, FILM COATED ORAL NIGHTLY
Status: DISCONTINUED | OUTPATIENT
Start: 2020-01-01 | End: 2020-01-01

## 2020-01-01 RX ORDER — TAMSULOSIN HYDROCHLORIDE 0.4 MG/1
0.4 CAPSULE ORAL 2 TIMES DAILY
Status: DISCONTINUED | OUTPATIENT
Start: 2020-01-01 | End: 2020-01-01

## 2020-01-01 RX ORDER — ONDANSETRON 2 MG/ML
4 INJECTION INTRAMUSCULAR; INTRAVENOUS EVERY 4 HOURS PRN
Status: DISCONTINUED | OUTPATIENT
Start: 2020-01-01 | End: 2020-01-01

## 2020-01-01 RX ORDER — ACETAMINOPHEN 325 MG/1
650 TABLET ORAL EVERY 4 HOURS PRN
Status: DISCONTINUED | OUTPATIENT
Start: 2020-01-01 | End: 2020-01-01

## 2020-01-01 RX ORDER — HYDRALAZINE HYDROCHLORIDE 20 MG/ML
10 INJECTION INTRAMUSCULAR; INTRAVENOUS EVERY 4 HOURS PRN
Status: DISCONTINUED | OUTPATIENT
Start: 2020-01-01 | End: 2020-01-01

## 2020-01-01 RX ORDER — ACETAMINOPHEN 650 MG/1
650 SUPPOSITORY RECTAL EVERY 4 HOURS PRN
Status: DISCONTINUED | OUTPATIENT
Start: 2020-01-01 | End: 2020-01-01

## 2020-01-01 RX ORDER — LOVASTATIN 10 MG/1
TABLET ORAL
Qty: 90 TABLET | Refills: 1 | Status: SHIPPED | OUTPATIENT
Start: 2020-01-01 | End: 2020-01-01 | Stop reason: CLARIF

## 2020-01-01 RX ORDER — SODIUM CHLORIDE 9 MG/ML
INJECTION, SOLUTION INTRAVENOUS CONTINUOUS
Status: DISCONTINUED | OUTPATIENT
Start: 2020-01-01 | End: 2020-01-01

## 2020-01-01 RX ORDER — SENNOSIDES 8.6 MG
17.2 TABLET ORAL NIGHTLY
Status: DISCONTINUED | OUTPATIENT
Start: 2020-01-01 | End: 2020-01-01

## 2020-01-01 RX ORDER — MONTELUKAST SODIUM 10 MG/1
TABLET ORAL
Qty: 90 TABLET | Refills: 3 | Status: SHIPPED | OUTPATIENT
Start: 2020-01-01 | End: 2020-01-01 | Stop reason: CLARIF

## 2020-01-01 RX ORDER — AMLODIPINE BESYLATE 5 MG/1
TABLET ORAL
Qty: 90 TABLET | Refills: 0 | OUTPATIENT
Start: 2020-01-01

## 2020-01-01 RX ORDER — IRBESARTAN 75 MG/1
75 TABLET ORAL NIGHTLY
Qty: 90 TABLET | Refills: 3 | Status: ON HOLD | OUTPATIENT
Start: 2020-01-01 | End: 2020-01-01

## 2020-01-01 RX ORDER — AMLODIPINE BESYLATE 10 MG/1
10 TABLET ORAL DAILY
COMMUNITY
End: 2020-01-01

## 2020-01-01 RX ORDER — MONTELUKAST SODIUM 10 MG/1
10 TABLET ORAL NIGHTLY
Status: DISCONTINUED | OUTPATIENT
Start: 2020-01-01 | End: 2020-01-01

## 2020-01-01 RX ORDER — ONDANSETRON 2 MG/ML
4 INJECTION INTRAMUSCULAR; INTRAVENOUS EVERY 6 HOURS PRN
Status: DISCONTINUED | OUTPATIENT
Start: 2020-01-01 | End: 2020-01-01

## 2020-01-01 RX ORDER — LORAZEPAM 2 MG/ML
0.5 INJECTION INTRAMUSCULAR ONCE
Status: COMPLETED | OUTPATIENT
Start: 2020-01-01 | End: 2020-01-01

## 2020-01-01 RX ORDER — LORAZEPAM 2 MG/ML
0.5 INJECTION INTRAMUSCULAR EVERY 8 HOURS PRN
Status: DISCONTINUED | OUTPATIENT
Start: 2020-01-01 | End: 2020-01-01

## 2020-01-01 RX ORDER — HYDROCODONE BITARTRATE AND ACETAMINOPHEN 10; 325 MG/1; MG/1
2 TABLET ORAL EVERY 6 HOURS PRN
Status: DISCONTINUED | OUTPATIENT
Start: 2020-01-01 | End: 2020-01-01

## 2020-01-01 RX ORDER — IRBESARTAN 75 MG/1
75 TABLET ORAL NIGHTLY
Status: ON HOLD | COMMUNITY
End: 2020-01-01

## 2020-01-01 RX ORDER — AZELASTINE 1 MG/ML
2 SPRAY, METERED NASAL 2 TIMES DAILY
Status: DISCONTINUED | OUTPATIENT
Start: 2020-01-01 | End: 2020-01-01

## 2020-01-01 RX ORDER — LORAZEPAM 0.5 MG/1
TABLET ORAL
Qty: 90 TABLET | Refills: 0 | Status: SHIPPED | OUTPATIENT
Start: 2020-01-01

## 2020-01-01 RX ORDER — MECLIZINE HYDROCHLORIDE 25 MG/1
25 TABLET ORAL ONCE
Status: COMPLETED | OUTPATIENT
Start: 2020-01-01 | End: 2020-01-01

## 2020-01-01 RX ORDER — MONTELUKAST SODIUM 10 MG/1
10 TABLET ORAL NIGHTLY
Qty: 90 TABLET | Refills: 0 | Status: SHIPPED | OUTPATIENT
Start: 2020-01-01 | End: 2020-01-01

## 2020-01-01 RX ORDER — TAMSULOSIN HYDROCHLORIDE 0.4 MG/1
CAPSULE ORAL
Qty: 180 CAPSULE | Refills: 3 | Status: SHIPPED | OUTPATIENT
Start: 2020-01-01 | End: 2020-01-01 | Stop reason: CLARIF

## 2020-01-01 RX ORDER — FUROSEMIDE 10 MG/ML
20 INJECTION INTRAMUSCULAR; INTRAVENOUS
Status: COMPLETED | OUTPATIENT
Start: 2020-01-01 | End: 2020-01-01

## 2020-01-01 RX ORDER — ACETAMINOPHEN 325 MG/1
650 TABLET ORAL EVERY 6 HOURS PRN
Status: DISCONTINUED | OUTPATIENT
Start: 2020-01-01 | End: 2020-01-01

## 2020-01-01 RX ORDER — ASPIRIN 81 MG/1
324 TABLET, CHEWABLE ORAL ONCE
Status: COMPLETED | OUTPATIENT
Start: 2020-01-01 | End: 2020-01-01

## 2020-01-01 RX ORDER — LORAZEPAM 0.5 MG/1
0.5 TABLET ORAL 3 TIMES DAILY PRN
Qty: 90 TABLET | Refills: 0 | Status: SHIPPED | OUTPATIENT
Start: 2020-01-01 | End: 2020-01-01

## 2020-01-01 RX ORDER — METOCLOPRAMIDE HYDROCHLORIDE 5 MG/ML
5 INJECTION INTRAMUSCULAR; INTRAVENOUS EVERY 8 HOURS PRN
Status: DISCONTINUED | OUTPATIENT
Start: 2020-01-01 | End: 2020-01-01

## 2020-01-01 RX ORDER — HYDROCODONE BITARTRATE AND ACETAMINOPHEN 5; 325 MG/1; MG/1
1 TABLET ORAL EVERY 6 HOURS PRN
Status: DISCONTINUED | OUTPATIENT
Start: 2020-01-01 | End: 2020-01-01

## 2020-01-01 RX ORDER — ALPRAZOLAM 0.5 MG/1
0.5 TABLET ORAL 2 TIMES DAILY PRN
Status: DISCONTINUED | OUTPATIENT
Start: 2020-01-01 | End: 2020-01-01

## 2020-01-01 RX ORDER — HYDROCHLOROTHIAZIDE 12.5 MG/1
TABLET ORAL
Qty: 30 TABLET | Refills: 0 | OUTPATIENT
Start: 2020-01-01

## 2020-01-01 RX ORDER — AMLODIPINE BESYLATE 10 MG/1
10 TABLET ORAL
Qty: 90 TABLET | Refills: 0 | Status: SHIPPED | OUTPATIENT
Start: 2020-01-01 | End: 2020-01-01

## 2020-01-01 RX ORDER — CODEINE SULFATE 30 MG/1
30 TABLET ORAL EVERY 6 HOURS PRN
Qty: 20 TABLET | Refills: 0 | Status: ON HOLD | OUTPATIENT
Start: 2020-01-01 | End: 2020-01-01

## 2020-01-01 RX ORDER — HYDROCODONE BITARTRATE AND ACETAMINOPHEN 10; 325 MG/1; MG/1
1 TABLET ORAL EVERY 6 HOURS PRN
Status: DISCONTINUED | OUTPATIENT
Start: 2020-01-01 | End: 2020-01-01

## 2020-01-01 RX ORDER — ASPIRIN 325 MG
325 TABLET, DELAYED RELEASE (ENTERIC COATED) ORAL NIGHTLY
Status: DISCONTINUED | OUTPATIENT
Start: 2020-01-01 | End: 2020-01-01

## 2020-01-01 RX ORDER — TRAMADOL HYDROCHLORIDE 50 MG/1
50 TABLET ORAL EVERY 12 HOURS PRN
Status: DISCONTINUED | OUTPATIENT
Start: 2020-01-01 | End: 2020-01-01

## 2020-01-01 RX ORDER — AMLODIPINE BESYLATE 10 MG/1
TABLET ORAL
Qty: 90 TABLET | Refills: 0 | Status: SHIPPED | OUTPATIENT
Start: 2020-01-01 | End: 2020-01-01 | Stop reason: CLARIF

## 2020-01-01 RX ORDER — LOVASTATIN 10 MG/1
10 TABLET ORAL DAILY
Status: ON HOLD | COMMUNITY
End: 2020-01-01

## 2020-01-01 RX ORDER — HYDROCODONE BITARTRATE AND ACETAMINOPHEN 10; 325 MG/1; MG/1
1 TABLET ORAL EVERY 6 HOURS PRN
Qty: 30 TABLET | Refills: 0 | Status: SHIPPED | OUTPATIENT
Start: 2020-01-01

## 2020-01-01 RX ORDER — SODIUM CHLORIDE 9 MG/ML
INJECTION, SOLUTION INTRAVENOUS ONCE
Status: COMPLETED | OUTPATIENT
Start: 2020-01-01 | End: 2020-01-01

## 2020-01-01 RX ORDER — MONTELUKAST SODIUM 10 MG/1
10 TABLET ORAL NIGHTLY
COMMUNITY

## 2020-01-01 RX ORDER — FUROSEMIDE 10 MG/ML
20 INJECTION INTRAMUSCULAR; INTRAVENOUS ONCE
Status: COMPLETED | OUTPATIENT
Start: 2020-01-01 | End: 2020-01-01

## 2020-01-01 RX ORDER — FAMOTIDINE 10 MG/ML
20 INJECTION, SOLUTION INTRAVENOUS DAILY
Status: DISCONTINUED | OUTPATIENT
Start: 2020-01-01 | End: 2020-01-01

## 2020-01-01 RX ORDER — HEPARIN SODIUM 5000 [USP'U]/ML
5000 INJECTION, SOLUTION INTRAVENOUS; SUBCUTANEOUS EVERY 8 HOURS SCHEDULED
Status: DISCONTINUED | OUTPATIENT
Start: 2020-01-01 | End: 2020-01-01

## 2020-01-01 RX ORDER — ACETAMINOPHEN 160 MG
2000 TABLET,DISINTEGRATING ORAL DAILY
Status: DISCONTINUED | OUTPATIENT
Start: 2020-01-01 | End: 2020-01-01

## 2020-01-01 RX ORDER — FAMOTIDINE 20 MG/1
20 TABLET ORAL DAILY
Status: DISCONTINUED | OUTPATIENT
Start: 2020-01-01 | End: 2020-01-01

## 2020-01-01 RX ORDER — NITROGLYCERIN 0.4 MG/1
0.4 TABLET SUBLINGUAL EVERY 5 MIN PRN
Status: DISCONTINUED | OUTPATIENT
Start: 2020-01-01 | End: 2020-01-01

## 2020-01-01 RX ORDER — IRBESARTAN 150 MG/1
150 TABLET ORAL NIGHTLY
Qty: 90 TABLET | Refills: 3 | Status: ON HOLD | OUTPATIENT
Start: 2020-01-01 | End: 2020-01-01

## 2020-01-01 RX ORDER — ENOXAPARIN SODIUM 100 MG/ML
30 INJECTION SUBCUTANEOUS DAILY
Status: DISCONTINUED | OUTPATIENT
Start: 2020-01-01 | End: 2020-01-01

## 2020-01-01 RX ORDER — ASPIRIN 81 MG/1
81 TABLET ORAL DAILY
COMMUNITY

## 2020-01-01 RX ORDER — ALBUMIN (HUMAN) 12.5 G/50ML
25 SOLUTION INTRAVENOUS EVERY 8 HOURS
Status: COMPLETED | OUTPATIENT
Start: 2020-01-01 | End: 2020-01-01

## 2020-01-01 RX ORDER — LORAZEPAM 0.5 MG/1
0.5 TABLET ORAL 3 TIMES DAILY PRN
Status: DISCONTINUED | OUTPATIENT
Start: 2020-01-01 | End: 2020-01-01

## 2020-01-01 RX ORDER — FINASTERIDE 5 MG/1
5 TABLET, FILM COATED ORAL DAILY
Status: DISCONTINUED | OUTPATIENT
Start: 2020-01-01 | End: 2020-01-01

## 2020-01-01 RX ORDER — LORAZEPAM 0.5 MG/1
TABLET ORAL
Qty: 90 TABLET | Refills: 0 | Status: CANCELLED | OUTPATIENT
Start: 2020-01-01

## 2020-01-01 RX ORDER — TAMSULOSIN HYDROCHLORIDE 0.4 MG/1
0.4 CAPSULE ORAL 2 TIMES DAILY
COMMUNITY

## 2020-01-01 RX ORDER — TOLVAPTAN 30 MG/1
30 TABLET ORAL ONCE
Status: DISCONTINUED | OUTPATIENT
Start: 2020-01-01 | End: 2020-01-01

## 2020-01-01 RX ORDER — AMLODIPINE BESYLATE 10 MG/1
10 TABLET ORAL
Qty: 90 TABLET | Refills: 3 | Status: ON HOLD | OUTPATIENT
Start: 2020-01-01 | End: 2020-01-01

## 2020-01-01 RX ORDER — LIDOCAINE HYDROCHLORIDE 20 MG/ML
5 SOLUTION OROPHARYNGEAL
Status: DISCONTINUED | OUTPATIENT
Start: 2020-01-01 | End: 2020-01-01

## 2020-01-01 RX ORDER — AMLODIPINE BESYLATE 10 MG/1
10 TABLET ORAL DAILY
Refills: 0 | COMMUNITY
Start: 2020-01-01 | End: 2020-01-01

## 2020-01-01 RX ORDER — AMLODIPINE BESYLATE 5 MG/1
10 TABLET ORAL DAILY
Status: DISCONTINUED | OUTPATIENT
Start: 2020-01-01 | End: 2020-01-01

## 2020-01-01 RX ORDER — PSEUDOEPHEDRINE HCL 30 MG
100 TABLET ORAL 2 TIMES DAILY
Qty: 30 CAPSULE | Refills: 0 | Status: SHIPPED | OUTPATIENT
Start: 2020-01-01

## 2020-01-01 RX ORDER — AMLODIPINE BESYLATE 10 MG/1
10 TABLET ORAL DAILY
Qty: 90 TABLET | Refills: 1 | Status: ON HOLD | OUTPATIENT
Start: 2020-01-01 | End: 2020-01-01

## 2020-01-01 RX ORDER — DOCUSATE SODIUM 100 MG/1
100 CAPSULE, LIQUID FILLED ORAL 2 TIMES DAILY
Status: DISCONTINUED | OUTPATIENT
Start: 2020-01-01 | End: 2020-01-01

## 2020-01-01 RX ORDER — SODIUM CHLORIDE 9 MG/ML
INJECTION, SOLUTION INTRAVENOUS CONTINUOUS
Status: ACTIVE | OUTPATIENT
Start: 2020-01-01 | End: 2020-01-01

## 2020-01-06 NOTE — TELEPHONE ENCOUNTER
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       Vanessa Bowen RN   Progress Notes   Creation Time:  1/6/2020  8:46 AM            Re

## 2020-01-06 NOTE — PROGRESS NOTES
Repeat BP check 162/68  Patient repositioned from sitting on the table to sitting in chair with back supported and feet on floor.     BP in chair 160/64    See tel enc 1/6/20

## 2020-01-06 NOTE — TELEPHONE ENCOUNTER
Left message on patient voicemail that mychart is being sent. Advised to call office if any questions.       Future lab ordered  Recall in epic

## 2020-01-06 NOTE — TELEPHONE ENCOUNTER
BP averaging too high.   I'd up irbesartan to 300mg daily (can take 2 of the 150mg at the same time to start, can send 300mg pill when needed) and nurse visit in 4 weeks, bring in home readings again, can also do BMP at toño time

## 2020-01-06 NOTE — PROGRESS NOTES
Pt was in office for BP check per [address]    Pt advises he took his BP medication at 7am this morning- Amlodipine 5mg and Irbesartan 150mg    PT also provided list of BP readings- place din [address] inbox    1st reading  162/78 Right arm sitting P 84    Pt was asked

## 2020-01-22 PROBLEM — Z93.6 OTHER ARTIFICIAL OPENINGS OF URINARY TRACT STATUS (HCC): Status: ACTIVE | Noted: 2020-01-01

## 2020-01-22 PROBLEM — N28.9 RENAL INSUFFICIENCY: Status: RESOLVED | Noted: 2018-05-31 | Resolved: 2020-01-01

## 2020-01-22 PROBLEM — N18.30 CHRONIC KIDNEY DISEASE, STAGE III (MODERATE) (HCC): Status: ACTIVE | Noted: 2020-01-01

## 2020-01-22 PROBLEM — K56.7 POSTOPERATIVE ILEUS (HCC): Status: RESOLVED | Noted: 2019-04-03 | Resolved: 2020-01-01

## 2020-01-22 PROBLEM — K91.89 POSTOPERATIVE ILEUS (HCC): Status: RESOLVED | Noted: 2019-04-03 | Resolved: 2020-01-01

## 2020-01-22 PROBLEM — Z97.8 FOLEY CATHETER IN PLACE: Status: RESOLVED | Noted: 2019-04-02 | Resolved: 2020-01-01

## 2020-01-22 NOTE — PROGRESS NOTES
HPI:   Dave Daniel is a 80year old male who presents for a Medicare Subsequent Annual Wellness visit (Pt already had Initial Annual Wellness).     Per 1/16/2020 onc visit:  Oncologic Diagnosis:   4.2019 pT4Nx high grade multifocal urothelial carcinoma at [facility]. His recent imaging could not be completed as his sCr was elevated so abdominal MRI was not done due to inability to give contrast and he end up with a pelvis MRI, kidney US and CXR. His 14 category ROS is otherwise unremarkable. 50s, SBP 120s-130s, DBP 50s.        Fall/Risk Assessment   He has been screened for Falls and is low risk: Fall/Risk Scorin    Cognitive Assessment     What day of the week is this?: Correct  What month is it?: Correct  What year is it?: Correct  Recall (65.6 kg)  11/04/19 : 143 lb 1.3 oz (64.9 kg)     Last Cholesterol Labs:   Lab Results   Component Value Date    CHOLEST 153 09/11/2017    HDL 54 09/11/2017    LDL 76 09/11/2017    TRIG 113 09/11/2017          Last Chemistry Labs:   Lab Results   Component alcohol or use drugs.      REVIEW OF SYSTEMS:   GENERAL: feels well otherwise  SKIN: denies any unusual skin lesions  EYES: denies blurred vision or double vision  HEENT: denies nasal congestion, sinus pain or ST  LUNGS: denies shortness of breath with exer tenderness   Lungs:   Clear to auscultation bilaterally, respirations unlabored   Chest Wall:  No tenderness or deformity   Heart:  Regular rate and rhythm, S1, S2 normal, no murmur, rub or gallop   Abdomen:   Soft, non-tender, bowel sounds active all four (10 mg total) by mouth once daily.     History of DVT (deep vein thrombosis)  -no recurrence and no bothersome symtpoms  History of Clostridium difficile colitis  -asymptomatic, no recurrence  SOHAN (obstructive sleep apnea)  -Well controlled, CPM   Benign pr Screening     LDL Annually LDL Cholesterol (mg/dL)   Date Value   09/11/2017 76     LDL CHOLESTROL (mg/dL)   Date Value   08/10/2012 107        EKG - w/ Initial Preventative Physical Exam only, or if medically necessary Electrocardiogram date11/04/2019 benefits      SPECIFIC DISEASE MONITORING Internal Lab or Procedure External Lab or Procedure      Annual Monitoring of Persistent     Medications (ACE/ARB, digoxin diuretics, anticonvulsants.)    Potassium  Annually Potassium (mmol/L)   Date Value   12/05

## 2020-02-06 PROBLEM — Z93.6 OTHER ARTIFICIAL OPENINGS OF URINARY TRACT STATUS (HCC): Status: RESOLVED | Noted: 2020-01-01 | Resolved: 2020-01-01

## 2020-02-21 PROBLEM — D64.9 ANEMIA: Status: ACTIVE | Noted: 2020-01-01

## 2020-02-21 PROBLEM — R42 DIZZINESS: Status: ACTIVE | Noted: 2020-01-01

## 2020-02-21 PROBLEM — R26.81 UNSTEADY GAIT: Status: ACTIVE | Noted: 2020-01-01

## 2020-02-22 NOTE — SLP NOTE
ADULT SWALLOWING EVALUATION    ASSESSMENT    ASSESSMENT/OVERALL IMPRESSION:  Pt seen this morning for BSSE per stroke protocol. Nurse approved this evaluation and reported pt has been tolerating current diet of regular/thin. No family was present.  Pt is an Precautions: Upright position; Slow rate;Small bites and sips  Medication Administration Recommendations: No restrictions     Treatment Plan/Recommendations: Dysphagia therapy(Communication evaluation pending MRI results)     Discharge Recommendations/Plan: liquids;Puree;Hard solid  Method of Presentation: Self presentation;Spoon;Cup;Straw;Single sips; Consecutive swallows  Patient Positioning: Upright;Midline    Oral Phase of Swallow: Impaired  Bolus Retrieval: Intact  Bilabial Seal: Intact  Bolus Formation:

## 2020-02-22 NOTE — PROGRESS NOTES
Pharmacy Note: Renal dose adjustment for Metoclopramide (Reglan)  Whit Stilwell has been prescribed Metoclopramide (Reglan) 10 mg every 8 hours as needed. Estimated Creatinine Clearance: 23.8 mL/min (A) (based on SCr of 1.97 mg/dL (H)).     His calculat

## 2020-02-22 NOTE — ED PROVIDER NOTES
Patient Seen in: [facility] Emergency Department In [place]      History   Patient presents with:  Dizziness    Stated Complaint: DIZZY    HPI    The patient is a an 80-year-old male presents emergency room with a history of multiple complaints.   Patient OTHER      bladder repair   • OTHER SURGICAL HISTORY      bladder cancer removal   • OTHER SURGICAL HISTORY      prostatectomy                    Social History    Tobacco Use      Smoking status: Never Smoker      Smokeless tobacco: Never Used      Tobacc over 5 in all 4 extremities. There are no gross motor or sensory deficits appreciated. Cranial nerves II through XII are intact. Patient answering all questions appropriately.           ED Course     Labs Reviewed   COMP METABOLIC PANEL (14) - Abnormal; No Mild left basilar atelectasis and/or scarring.     Dictated by: Tracey Banks MD on 2/21/2020 at 20:17     Approved by: Tracey Banks MD on 2/21/2020 at 20:19                MDM     Patient had an IV line established blood work drawn including a CBC

## 2020-02-22 NOTE — PROGRESS NOTES
[facility] Pharmacy Note: Renal dose adjustment for Enoxaparin (Lovenox)  Elisabet Best has been prescribed Enoxaparin (Lovenox)  40 mg subcutaneously every 24 hours. Estimated Creatinine Clearance: 23.8 mL/min (A) (based on SCr of 1.97 mg/dL (H)).     His ca

## 2020-02-22 NOTE — PHYSICAL THERAPY NOTE
PHYSICAL THERAPY QUICK EVALUATION - INPATIENT    Room Number: 7277/3461-F  Evaluation Date: 2/22/2020  Presenting Problem: dizziness  Physician Order: PT Eval and Treat    Problem List  Principal Problem:    Dizziness  Active Problems:    Anemia    Unste FINDINGS   Functional vision and coordination intact  Able to follow multistep commands  Able to recall remote and short term memory   Able to locate room after ambulation                  ACTIVITY TOLERANCE  Pulse: (84-98)  Heart Rate Source: Monitor this evaluation, patient's clinical presentation is stable and overall evaluation complexity is considered low. Pt able to perform dynamic gait activities such as sudden stop, change in speed, and head turns without symptoms of dizziness or LOB.   PT appro

## 2020-02-22 NOTE — ED INITIAL ASSESSMENT (HPI)
C/o feeling dizzy and \"wobbly\" when he walks, headache. States he felt good all day until about 2 hours ago. Speech clear, alert and oriented. Also c/o that his heart was racing but that he was not SOB.

## 2020-02-22 NOTE — CONSULTS
Neurology H&P    Rivas Justo Best Patient Status:  Observation    1934 MRN [id number]   [facility] 7NE-A Attending Darcie Andino MD   Hosp Day # 0 PCP Lucio Marques MD     CC; unsteady gait    Subjective:  Fabi Guerrero is a(n) 80 yea venous thrombosis) ([facility].) 2/5/2013   • Essential hypertension    • Essential hypertension, benign 10/26/09   • Hearing impairment     bilateral hearing aids   • High blood pressure    • History of pulmonary embolism 2/5/2013   • Impaired fasting glucose 7/1 alert, ox3, normal attention, language and fund of knowledge.       CRANIAL NERVES II to XII: PERRLA, no ptosis or diplopia, EOM intact, facial sensation intact, strong eye closure, face is symmetric, no dysarthria, tongue midline,  no tongue fasciculations He has a non focal exam. I doubt stroke or TIA given exam. His symptoms are very vague and not associated with any numbness, slurred speech, weakness or tingling or facial weakness. He does have vascular risk factors and MRI brain and CUS has been ordered.

## 2020-02-22 NOTE — ED NOTES
Returned from CT and states that when he laid flat in CT his dizziness got worse, sitting up he feels fine

## 2020-02-22 NOTE — PLAN OF CARE
NURSING ADMISSION NOTE      Patient admitted via Cart  Oriented to room. Safety precautions initiated. Bed in low position. Bed alarm on. Call light in reach. Call light return demonstration performed. Admission navigator completed.          Assu

## 2020-02-22 NOTE — H&P
[facility] HOSPITALIST  History and Physical     Eduardo Josefina Best Patient Status:  Observation    1934 MRN [id number]   [facility] 7NE-A Attending Hi Pritchard MD   Hosp Day # 0 PCP Janel Veronica MD     Chief Complaint: imbalance    His SURGICAL HISTORY      bladder cancer removal   • OTHER SURGICAL HISTORY      prostatectomy       Social History:  reports that he has never smoked. He has never used smokeless tobacco. He reports that he does not drink alcohol or use drugs.     Family Histo Temp 98.1 °F (36.7 °C)   Resp 16   Ht 165.1 cm (5' 5\")   Wt 146 lb (66.2 kg)   SpO2 98%   BMI 24.30 kg/m²   General: No acute distress. Alert and oriented x 3. HEENT: Normocephalic atraumatic. Moist mucous membranes. EOM-I. PERRLA. Anicteric.   Neck: No l Ermelinda Wallis MD  2/22/2020

## 2020-02-22 NOTE — PLAN OF CARE
Problem: Impaired Swallowing  Goal: Minimize aspiration risk  Description  Interventions:  - Patient should be alert and upright for all feedings (90 degrees preferred)  - Offer food and liquids at a slow rate  - Encourage small bites of food and small s guidance from PT/OT  - Encourage visually impaired, hearing impaired and aphasic patients to use assistive/communication devices  2/22/2020 1718 by Estuardo Cadena RN  Outcome: Adequate for Discharge  2/22/2020 1313 by Estuardo Cadena RN  Outcome: Progressin

## 2020-02-22 NOTE — PHYSICAL THERAPY NOTE
Consult received, chart reviewed and spoke with RN. Pt on continued bedrest until 2/23, also pending neurology consult. PT to evaluate when pt appropriate to participate in mobility.   AE PT DPT NCS

## 2020-02-22 NOTE — PLAN OF CARE
Pt is alert and oriented x4, neurologically intact. MRI and carotid US pending. Pt NSR, on room air. Chemo precautions. Per Dr. Kari Baltazar, ok to dc bedrest. Possible dc later today pending results of MRI? Pt wife at bedside, updated on plan of care.

## 2020-02-22 NOTE — PROGRESS NOTES
02/22/20 0200 02/22/20 0205 02/22/20 0210   Vital Signs   /66 159/57 154/54   BP Location Left arm  --   --    BP Method Automatic  --   --    Patient Position Lying Sitting Standing     Pt denies dizziness/light headedness. Asymptomatic.

## 2020-02-23 NOTE — PLAN OF CARE
NURSING DISCHARGE NOTE    Discharged Home via Wheelchair. Accompanied by Spouse and Support staff  Belongings Taken by patient/family. IV removed. Dc instructions and f/u appts discussed, all questions answered.

## 2020-02-24 NOTE — PROGRESS NOTES
Initial Post Discharge Follow Up   Discharge Date: 2/22/20  Contact Date: 2/24/2020    Consent Verification:  Assessment Completed With: Patient  HIPAA Verified?   Yes    Discharge Dx:     Imbalance  Bladder CA  HTN    Was TCC ordered: no    General:   •

## 2020-02-24 NOTE — DISCHARGE SUMMARY
[facility] HOSPITALIST  DISCHARGE SUMMARY     Anthony Best Patient Status:  Observation    1934 MRN [id number]   [facility] 7NE-A Attending No att. providers found   Hosp Day # 0 PCP Franklyn Tee MD     Date of Admission: 2020  Levi Recommendation:  LACE > 58:  High Risk of readmission after discharge from the hospital.    Consultants:  • neuro    Discharge Medication List:     Discharge Medications      CHANGE how you take these medications      Instructions Prescription details   asp distress. Respiratory: Clear to auscultation bilaterally. No wheezes. No rhonchi. Cardiovascular: S1, S2. No rubs, no JVD  Abdomen: Soft, nontender, nondistended. Positive bowel sounds.     ---------------------------------------------------------------

## 2020-02-25 NOTE — PROGRESS NOTES
Spoke to pt and provided CCM program overview. Pt stated he would like to sign up for the program. Pt stated he will also discuss with pcp at visit tomorrow. Patient identified with a potential need for Chronic Care Management services.   Called patient

## 2020-02-26 NOTE — PROGRESS NOTES
Kaylene Lundborg is a 80year old male. HPI:   Pt is here for ER/UC/hospital f/u.     ER/UC or hospital: [address]     Date(s): 2/21/2020    Date of Admission: 2/21/2020  Date of Discharge: 2/22/2020     Discharge Disposition: Home or Self Care     [facility].  Consultants:  · neuro     Discharge Medication List:           Discharge Medications            CHANGE how you take these medications      Instructions Prescription details   aspirin EC 81 MG Tbec  What changed:    · how much to take  · when to vessel ischemic disease. 5. There is a questionable polyp along the left posterior aspect of the sphenoid sinus. This may be due to impacted mucus as well. Per neuro note in hospital:    Assessment:   This is an 81 y/o male with very vague feeling size.     Adrenal glands: Unremarkable.      Kidneys: Right kidney is absent.  Left kidney enhances normally, without hydronephrosis. [place] Dre is a left extrarenal pelvis. [place] Dre is and non-specific diffuse mild left urothelial thickening.  There are several follow-up.      -----    Patient reports feeling really well. He brings in all his records [facility], [facility] [place]. He has home BP readings as well.   He states he did stop the BP medications as prescribed but restarted them when Visual impairment     glasses       Past Surgical History:   Procedure Laterality Date   • CATARACT     • CHOLECYSTECTOMY     • HERNIA SURGERY      inguinal hernia x2   • LAPAROSCOPY, SURGICAL PROSTATECTOMY, RETROPUBIC RADICAL, W/NERVE SPARING     • OTHER chronic kidney disease, unspecified CKD stage  -stable; recheck in 2 months along with bmp  White coat syndrome with diagnosis of hypertension  -chronic white coat; home readings look good         The patient indicates understanding of these issues and agr

## 2020-03-02 NOTE — PROGRESS NOTES
I want to know a little about you. • What do you do for fun? Play bridge, garden, and travel  • Any hobbies? What Hobbies? Plays bridge and travel  • What do you do for work?  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 n/a  o Would you like more info n/a  o What are concerns or things that keep you from following this diet? n/a  o Do you want more information or resources to help “shake up” your usual meals? reviewed meds and compliance. Reviewed pt No diagnosis found. Interventions for following categories based on assessment  Health Maintenance: Up to date  Support system: Strong  Diet: Moderate  Exercise : Fair  Stress:  Moderate  Current Issue: Cancer

## 2020-03-24 NOTE — TELEPHONE ENCOUNTER
From: Diaz Best  To: Maritza Alexandre MD  Sent: 3/24/2020 1:23 PM CDT  Subject: Other    Dr. Charlene Lew for checking on us. We are fine. I’m getting low on lorazepam. [address] had a supplement on epidemics in [address].  I’ve saved & will share when

## 2020-04-20 NOTE — TELEPHONE ENCOUNTER
Shantelle notify patient preliminary dip test shows blood but no other concerning signs of infection, so unless he has fever/chills/nausea I'd prefer to hold off on antibiotic until I see follow-up testing oer the next few days and he is to call ASAP for fever

## 2020-04-20 NOTE — TELEPHONE ENCOUNTER
Patient presented for UA today. Denies fevers, chills, nausea, weak urine stream or pain. T97.9 today.  Temporal.

## 2020-04-20 NOTE — PROGRESS NOTES
4/20/2020  Spoke to [place] for CCM.       Updates to patient care team/ comments: None  Patient reported changes in medications: None  Med Adherence  Comment: Taking as directed     Health Maintenance: Up to date  Annual Physical due on 01/22/2021  Annual D the first step. I also advised to discuss lethargy with pcp. Time Spent This Encounter Total: 25 min medical record review, telephone communication, care plan updates where needed, education, goals and action plan recreation/update.  Provided acknowledgmen

## 2020-04-27 NOTE — TELEPHONE ENCOUNTER
Request is for 5 mg tabs. Last refilled for 10 mg 1/22/2020. Called and spoke with patient who confirms he is taking 10 mg tabs.     Last OV 2/26/2020:\"he's back on the irbesartan and amlodipine that were stopped by the hospital\"  2/26/2020 /60  La

## 2020-05-14 NOTE — TELEPHONE ENCOUNTER
LMTCB with Randi Garcia RN from Dr. Christi Avila office urology [facility].   Patient told me Randi Garcia had told him to ask me to help arrange his bladder care and I'm not clear on how I can help with that, would like to discuss

## 2020-05-15 NOTE — TELEPHONE ENCOUNTER
[facility] verbally consents to a Virtual/Telephone Check-In service on 5-18-20. Patient understands and accepts financial responsibility for any deductible, co-insurance and/or co-pays associated with this service.

## 2020-05-18 NOTE — PROGRESS NOTES
Whit Brooks is a 80year old male.   HPI:   Virtual Video/Telephone Check-In    Whit Brooks verbally consents to a Virtual/Telephone Check-In visit on 5/18/2020    Patient understands and accepts financial responsibility for any deductible, co-insuran well.      Current Outpatient Medications   Medication Sig Dispense Refill   • Montelukast Sodium (SINGULAIR) 10 MG Oral Tab Take 1 tablet (10 mg total) by mouth nightly.  90 tablet 0   • amLODIPine Besylate 10 MG Oral Tab Take 1 tablet (10 mg total) by rubén RADICAL, W/NERVE SPARING     • OTHER      bladder repair   • OTHER SURGICAL HISTORY      bladder cancer removal   • OTHER SURGICAL HISTORY      prostatectomy      Family History   Problem Relation Age of Onset   • Hypertension Sister    • Cancer Other plan  Seasonal allergic rhinitis due to other allergic trigger  -nasal spray (I believe azelastine) was $220 and effective for 10minutes at a time; will try singulair instead    We all discuss his nerves over his mortality he sees it in both his kidney/catina

## 2020-05-19 NOTE — TELEPHONE ENCOUNTER
Spoke to patient. He is going to have cytoscopy/bladder infusion at [facility] in [facility]. He is going to call back to provide the exact date and time along with address.

## 2020-05-19 NOTE — TELEPHONE ENCOUNTER
----- Message from Viridiana Tucker MD sent at 5/18/2020 12:14 PM CDT -----  Regarding: medical taxi resources  Patient needs transportation for cystoscopy/bladder infusion next week.   Can you please see if any local resources to help him and get him that inf

## 2020-05-19 NOTE — TELEPHONE ENCOUNTER
Patient called back. His procedure is 5/29/20 at 9:15 am.  [address] [address]    Procedure takes about 4 hours. He said if we are unable to find a ride for him he will figure something out.

## 2020-05-19 NOTE — TELEPHONE ENCOUNTER
Called [phone]. Was told to call [phone] to see if [facility] services the area the patient lives in. Spoke to Danelle Mayfield at [phone]. Provided her with patient's address. She said [facility] does not service that area.

## 2020-05-19 NOTE — TELEPHONE ENCOUNTER
Spoke to [employer] at [phone]. They [facility]

## 2020-05-19 NOTE — TELEPHONE ENCOUNTER
Spoke to patient. Provided number for [facility] ([phone]). Patient verbalized understanding.

## 2020-05-19 NOTE — TELEPHONE ENCOUNTER
Noted.   Please suggest to him he call senior services directly  They may be able to provide him with a volunteer  for a day (?) or some other ideas to help him out, thanks

## 2020-05-19 NOTE — TELEPHONE ENCOUNTER
[address] [facility] Provided phone number for [employer] and [employer]. Spoke to [employer] at [phone]. Was told to call [phone] to see if patient qualifies for transit.

## 2020-06-15 NOTE — PROGRESS NOTES
6/15/2020  Spoke to [place] for CCM.       Updates to patient care team/ comments: None  Patient reported changes in medications: None  Med Adherence  Comment: Taking as directed     Health Maintenance: Up to date  Annual Physical due on 01/22/2021  Annual D Listened to pt express in detail personal concerns r/t his health, provided empathy and sympathy. Encouraged pt to rest prn for energy. Advised to call prn.    Time Spent This Encounter Total: 37 min medical record review, telephone communication, care plan

## 2020-06-17 NOTE — TELEPHONE ENCOUNTER
These are potentially tough decisions we're all going to have to make, I think they're personal/individual decisions, we have to decide what risk we're willing to live with, I wish I had a better answer.   The truth is I don't know if it's safe, but it's no

## 2020-06-17 NOTE — TELEPHONE ENCOUNTER
Pt called he is wanting to know if it's safe for him to have his kids coming over for father's day? And how much he should be going out?

## 2020-07-16 NOTE — PROGRESS NOTES
Called patient for monthly CCM outreach, pt stated now is not a good time to talk he is heading to [facility] to get chemo. I advised pt I will call him back next week.        Chart review - 5 min  Time with patient - 1 min  Total time - 6 min

## 2020-07-27 NOTE — PROGRESS NOTES
Called patient for monthly CCM outreach, no answer no machine picked up.       Chart review - 2 min  Time with patient - 0 min  Total time - 8 min

## 2020-07-27 NOTE — TELEPHONE ENCOUNTER
Hypertension Medications Protocol Failed7/26 10:57 AM   Last serum creatinine< 2.0    CMP or BMP in past 12 months    Appointment in past 6 or next 3 months     Routing to provider per protocol. Last refilled on 4/27/20 for # 90 with 0 rf.   Last labs 6/

## 2020-07-27 NOTE — PROGRESS NOTES
7/27/2020  Spoke to [place] for CCM.       Updates to patient care team/ comments: None  Patient reported changes in medications: None  Med Adherence  Comment: Taking as directed     Health Maintenance:  Up to date  Influenza Vaccine(1) due on 09/01/2020  An very confident               - confidence: : 3      Care Manager Follow Up: 1 month  Reason For Follow Up: review progress and or barriers towards patients goals.      Care managers interventions: Discussed with pt se of irbesartan and fatigue is on the lis

## 2020-07-31 NOTE — TELEPHONE ENCOUNTER
So his 2 week period now started 3 days ago, same testing guidelines apply as below.   If son tests positive and patient remains asymptomatic he doesn't have to test if he just quarantines for 2 weeks and lets us know if develops respiratory symptoms (but h

## 2020-07-31 NOTE — TELEPHONE ENCOUNTER
Pt called his daughter in law tested positive for Covid. He was with her about 2 weeks ago and has been with his son recently. He is wanting to know he can get tested for covid?

## 2020-07-31 NOTE — TELEPHONE ENCOUNTER
THE [facility] isn't testing asymptomatic people, but also I don't think he needs to be tested. IF he was around her 2 weeks ago, we still think symptoms will appear within 2 weeks of exposure so he should be in the clear.   If he really wants testing anyway Harjeet

## 2020-08-17 NOTE — TELEPHONE ENCOUNTER
Pt failed refill protocol for the following reasons:        Asthma & COPD Medication Protocol Failed8/15 3:10 PM   Asthma Action Score greater than or equal to 20    AAP/ACT given in last 12 months     Montelukast:  Last refill: 5- #90 with 0 refill

## 2020-08-25 NOTE — PROGRESS NOTES
8/25/2020  Spoke to Buddy Villalobos for CCM.       Updates to patient care team/ comments: None  Patient reported changes in medications: None  Med Adherence  Comment: Taking as directed     Health Maintenance: Up to date  Influenza Vaccine(1) due on 09/01/2020  Susan Cortez - confidence: : 3      Care Manager Follow Up: 1 month  Reason For Follow Up: review progress and or barriers towards patients goals.      Care managers interventions: Listened to pt express personal health hx and how he ended up with the care team he

## 2020-09-22 NOTE — TELEPHONE ENCOUNTER
From: Levi Best  To: Alonzo Valdez MD  Sent: 9/21/2020 9:47 PM CDT  Subject: Non-Urgent Medical Question    Dr. Rosamaria Domínguez-  BP seems good- diastolic 62; systolic 819; Rate 62.   I'm slow with poor appetite; the Montelukast has opened the nostrils and stoppe

## 2020-09-22 NOTE — PROGRESS NOTES
9/22/2020  Spoke to Maged Khan for CCM.       Updates to patient care team/ comments: None  Patient reported changes in medications: None  Med Adherence  Comment: Taking as directed     Health Maintenance: Up to date  Influenza Vaccine(1) due on 10/01/2020  Zahira Warren Patient agrees to goal action plan.   Self-Management Abilities (patient reported)             1= least confident in achieving goal, 5= very confident               - confidence: : 4      Care Manager Follow Up: 1 month  Reason For Follow Up: review p

## 2020-09-27 PROBLEM — R79.89 AZOTEMIA: Status: ACTIVE | Noted: 2020-01-01

## 2020-09-27 PROBLEM — E87.5 HYPERKALEMIA: Status: ACTIVE | Noted: 2020-01-01

## 2020-09-27 PROBLEM — R07.9 CHEST PAIN OF UNCERTAIN ETIOLOGY: Status: ACTIVE | Noted: 2020-01-01

## 2020-09-27 PROBLEM — E87.20 METABOLIC ACIDOSIS: Status: ACTIVE | Noted: 2020-01-01

## 2020-09-27 PROBLEM — E87.2 METABOLIC ACIDOSIS: Status: ACTIVE | Noted: 2020-01-01

## 2020-09-27 PROCEDURE — 99203 OFFICE O/P NEW LOW 30 MIN: CPT | Performed by: INTERNAL MEDICINE

## 2020-09-27 NOTE — ED PROVIDER NOTES
Patient Seen in: THE [facility] Emergency Department In [place]      History   Patient presents with:  Chest Pain    Stated Complaint: chest pain that started this morning     HPI    80year-old with a history of renal cell cancer status post nephrectomy, chr Social History    Tobacco Use      Smoking status: Never Smoker      Smokeless tobacco: Never Used      Tobacco comment: Non-smoker    Alcohol use: No      Alcohol/week: 0.0 standard drinks    Drug use:  No             Review of Systems Lymphocyte Absolute 0.86 (*)     All other components within normal limits   TROPONIN I - Normal   CBC WITH DIFFERENTIAL WITH PLATELET    Narrative: The following orders were created for panel order CBC WITH DIFFERENTIAL WITH PLATELET.   Procedure List                       Present on Admission  Date Reviewed: 5/18/2020          ICD-10-CM Noted POA    Azotemia R79.89 9/27/2020 Yes    Chest pain of uncertain etiology O90.48 9/27/2020 Unknown    Hyperkalemia E87.5 6/30/1248 Yes    Metabolic acidosis E

## 2020-09-27 NOTE — H&P
[facility]  History and Physical     Ary Siddiqi Benjichi Patient Status:  Emergency    1934 MRN [id number]   Location [address] [facility] Attending Chayo Gan MD   Hosp Day # 0 PCP Roc Apple MD     Chief Complaint: Paty Malone reports that he has never smoked. He has never used smokeless tobacco. He reports that he does not drink alcohol or use drugs.     Family History:   Family History   Problem Relation Age of Onset   • Hypertension Sister    • Cancer Other         Colon   • H Cardiovascular: S1, S2. Regular rate and rhythm. (+) anterior left chest wall tenderness   Chest and Back: No tenderness or deformity. Abdomen: Soft, nontender, nondistended. Positive bowel sounds. No rebound, guarding or organomegaly.   Musculoskeletal

## 2020-09-27 NOTE — PLAN OF CARE
Rec'd patient from ER this evening. Patient a/o.vss RT in room instructing on IS. IVF started. Patient steady in room.   Problem: Patient/Family Goals  Goal: Patient/Family Long Term Goal  Description: Patient's Long Term Goal: DC    Interventions:  - See a

## 2020-09-27 NOTE — PROGRESS NOTES
09/27/20 1756 09/27/20 1800 09/27/20 1802   Vital Signs   Pulse 64 73 77   Heart Rate Source Monitor Monitor Monitor   Resp 18 18 18   Respiratory Quality Normal Normal Normal   /60 (!) 166/62 (!) 166/63   BP Location Left arm Left arm Left arm

## 2020-09-27 NOTE — CONSULTS
[facility] EMERGENCY DEPARTMENT IN [place]    Report of Consultation    Naveed Best Patient Status:  Emergency    1934 MRN [id number]   Location [facility] EMERGENCY DEPARTMENT IN [place] Attending Mariam Cunningham MD   Hosp Day # 0 PCP [facility] glasses      Past Surgical History:   Procedure Laterality Date   • CATARACT     • CHOLECYSTECTOMY     • HERNIA SURGERY      inguinal hernia x2   • LAPAROSCOPY, SURGICAL PROSTATECTOMY, RETROPUBIC RADICAL, W/NERVE SPARING     • OTHER      bladder repair RBC 3.99 09/27/2020    HGB 12.3 09/27/2020    HCT 37.6 09/27/2020    MCV 94.2 09/27/2020    MCH 30.8 09/27/2020    MCHC 32.7 09/27/2020    RDW 13.6 09/27/2020    .0 09/27/2020     Lab Results   Component Value Date    PT 12.3 11/26/2013    PT 23.

## 2020-09-28 PROCEDURE — 99214 OFFICE O/P EST MOD 30 MIN: CPT | Performed by: INTERNAL MEDICINE

## 2020-09-28 NOTE — PROGRESS NOTES
[facility]  Cardiology Progress Note    Carolin Best Patient Status:  Observation    1934 MRN [id number]   [facility] 2NE-A Attending Del Albarran MD   Hosp Day # 0 PCP Mukesh Kaplan MD     Subjective:  Patient is feeling muc negative for PE. Not C/W ACS. EKG without acute changes  · HTN  · Dyslipidemia  · CKD with single kidney  · Prostate cancer s/p prostectomy  · Bladder cancer  · Hx of VTE    Plan:   1. Continue ASA, ARB, norvasc  2. If am EKG normal, ok to dc to home.

## 2020-09-28 NOTE — PROGRESS NOTES
[facility]  Progress Note     Brendon Best Patient Status:  Observation    1934 MRN [id number]   [facility] 2NE-A Attending Jalen Christian MD   Hosp Day # 0 PCP Angeli Marshall MD     Chief Complaint: Chest pain    S: Patient • aspirin  81 mg Oral Daily   • Vitamin D3  2,000 Units Oral Daily   • finasteride  5 mg Oral Daily   • Losartan Potassium  75 mg Oral Nightly   • Montelukast Sodium  10 mg Oral Nightly   • tamsulosin HCl  0.4 mg Oral BID   • lidocaine-menthol  1 patch T

## 2020-09-28 NOTE — DISCHARGE SUMMARY
[facility]  DISCHARGE SUMMARY     Petra Best Patient Status:  Observation    1934 MRN [id number]   [facility] 2NE-A Attending No att. providers found   Hosp Day # 0 PCP Vernon Pompa MD     Date of Admission: 2020  Levi has imaging planned as outpatient. Home today with outpatient follow-up. Lace+ Score: 72  59-90 High Risk  29-58 Medium Risk  0-28   Low Risk         TCM Follow-Up Recommendation:  LACE > 58:  High Risk of readmission after discharge from the hospital.

## 2020-09-28 NOTE — PLAN OF CARE
Pt ambulating in hallway on his own. States he still has minor pain in same area of chest. Cardiology coming to see pt .  Possible discharge home

## 2020-09-28 NOTE — DIETARY NOTE
[facility]    NUTRITION INITIAL ASSESSMENT    Pt does not meet malnutrition criteria.     NUTRITION DIAGNOSIS/PROBLEM:    Inadequate energy intake related to physiological causes as evidenced by wt loss of 8 lbs or 5% over 1 year, reported poor oral in RELATED HISTORY:  Appetite: Poor  Intake: 75%  Intake Meeting Needs: No, but supplements to maximize  Food Allergies: No  Cultural/Ethnic/Christianity Preferences Addresses: Yes    NUTRITION RELATED PHYSICAL FINDINGS:     1. Body Fat/Muscle Mass: moderate dep

## 2020-09-28 NOTE — PLAN OF CARE
Patient alert and oriented. On RA, maintaining normal saturations. NSR on tele. Bowel sounds present, incontinent of bladder. Denies pain at this time. Vadim Camacho 9NS infusing in right AC. Patient updated with plan of care, will continue to monitor.      Problem: Sergei Jaimes

## 2020-09-30 NOTE — TELEPHONE ENCOUNTER
Patient discharged home from [facility] on 9/28/2020 and is at High risk for readmission and recommended to have a TCM HFU by 10/5/2020 preferred or no later than 10/12/2020 or sooner.  KATHERIN attempted to schedule a TCM HFU, patient states he will call D

## 2020-09-30 NOTE — PROGRESS NOTES
Initial Post Discharge Follow Up   Discharge Date: 9/28/20  Contact Date: 9/30/2020    Consent Verification:  Assessment Completed With: Patient  HIPAA Verified? Yes    Discharge Dx:     1. Chest pain, atypical, trop neg  2. Essential hypertension  3. Yes:  o Where: patient has some pain in the middle of his back. Rating on pain scale 1-10, 10 being the worst pain you have ever experienced: 5/10  o Alleviating factors: no  o Aggravating factors: no  o Is the pain manageable at home?  yes  • When you we medications with you to make sure we are not missing anything? yes  • Are there any reasons that keep you from taking your medication as prescribed? No  Are you having any concerns with constipation?  Patient states he has a little bit of a problem with cons and blood glucose meter/supplies if applicable.

## 2020-10-13 NOTE — TELEPHONE ENCOUNTER
All of that sounds fine for doing flu shot given he was tested for covid since all those things started except the flu like symptoms last week--does he typically feel that after a bladder infusion?   If felt like his typical rxn to bladder infusion I'm fine

## 2020-10-13 NOTE — TELEPHONE ENCOUNTER
Spoke with the pt and  He states that he wants to get his flu shot but wanted to talk to you before he gets it.    100.3 max temp- but not for at least 10 days.   Had chemo put in bladder last week  And felt flu like    He takes singulair for runny nose- he

## 2020-10-13 NOTE — TELEPHONE ENCOUNTER
Spoke with the pt and advised of the notes from Dr. Matteo Ortega he states that the \"f;u like symptoms have been there for about 6 months.      Last covid test 10 days ago and it was negative    Per [facility] to bring pt in for flu shot  appt scheduled  Future Appoint

## 2020-10-13 NOTE — TELEPHONE ENCOUNTER
Patient wants to come in and have his flu shot. He wants to talk to a nurse before hand because he is having several COVID symptoms. He thinks it's his allergies and has been tested but wants to talk to someone before scheduling.  Fever, chills, runny nose,

## 2020-10-19 NOTE — PROGRESS NOTES
10/19/2020  Spoke to [place] for CCM.       Updates to patient care team/ comments: None  Patient reported changes in medications: None  Med Adherence  Comment: Taking as directed     Health Maintenance: Up to date  Annual Physical due on 01/22/2021  Annual energy level has improved some      • Update to previous barriers: n/a    • Patient Reported New Barriers And Concerns: None                   - Plan for overcoming all barriers: n/a            Patient agrees to goal action plan.   Self-Management Abilities

## 2020-10-29 NOTE — TELEPHONE ENCOUNTER
Hypertension Medications Protocol Eamrnq14/28/2020 08:47 PM   Appointment in past 6 or next 3 months Protocol Details    CMP or BMP in past 12 months     Last serum creatinine< 2.0      Last OV 5/18/2020  Last labs 9/28/2020 9/28/2020 /58  Last refi

## 2020-11-03 NOTE — PROGRESS NOTES
Pt was in office for labs per [address]    1 mint and 1 lav tube collected from L AC using straight needle and 1 attempt    Pt tolerated and was sent home in stable condition

## 2020-11-06 NOTE — TELEPHONE ENCOUNTER
Alverto Umanzor with [facility] called to speak with Dr Tiffany Pereyra regarding this patient. She is requesting a call back.

## 2020-11-06 NOTE — TELEPHONE ENCOUNTER
Updates me he has liver mets. However, BUn/Cr up, Potassium a bit up.   They are having him hydrate up at home (trying to keep him out of hospital setting with covid risk), having him recheck CMP early next week and he'd like to do that here and will call

## 2020-11-07 NOTE — TELEPHONE ENCOUNTER
Called patient to clarify below. Patient clarified needs to schedule labs. States RN was to contact Dr. Clarita Nuñez and fill her in about his condition. Advised patient that RN had discussed his condition yesterday.  Patient informed that pain meds were not discu

## 2020-11-07 NOTE — TELEPHONE ENCOUNTER
He now has liver Cancer The nurse from [facility] is to  asking for blood work. He is asking us what kind of pian relief he can take. Will be having  A biosopy on wed. States he is in a lot of pain.  Please call

## 2020-11-07 NOTE — TELEPHONE ENCOUNTER
Please let patient or caregiver know or leave message that I sent Codeine 30 mg tablets to the [facility] in [place] at 34/47. This will not cause issues with bleeding at the time of his biopsy and will not affect his biopsy results.  The 2 biggest side effects to watch

## 2020-11-09 NOTE — TELEPHONE ENCOUNTER
Last refilled on 3/24/20  for # 90 with 0 refills  Last OV televisit 5/29/20   No future appointments. Thank you.

## 2020-11-09 NOTE — TELEPHONE ENCOUNTER
Called patient to touch base on his recent news that he has mets in his liver. He wants further guidance on what to do. He has the sense that he won't survive this cancer now being metastatic, that this will be what takes him.     He's accepting that

## 2020-11-12 NOTE — TELEPHONE ENCOUNTER
I'm okay with tylenol as well, it preferable to NSAIDS given his kidney problems.   If the tylenol doesn't help could try tramadol, that's a prescription I could call in

## 2020-11-12 NOTE — TELEPHONE ENCOUNTER
Patient advised of the information per . Patient will call the office back if he needs the prescription.

## 2020-11-12 NOTE — TELEPHONE ENCOUNTER
I think it would be fine for him to take Tyelnol ES, 2 tablets today. F/u with Dr. Sherlyn Sanchez tomorrow to see what else she can do for his pain.   Thanks

## 2020-11-12 NOTE — TELEPHONE ENCOUNTER
Pt called he is having severe neck pain. He is wanting to know if it's ok for him to take Tylenol since he has liver problem?

## 2020-11-12 NOTE — TELEPHONE ENCOUNTER
Patient advised of the information per . Patient verbalized understanding. Any other recommendations for patient?   Patient advised that Rebekah Rdz will be in the office on Friday

## 2020-11-17 PROBLEM — E87.1 HYPONATREMIA: Status: ACTIVE | Noted: 2020-01-01

## 2020-11-17 NOTE — TELEPHONE ENCOUNTER
Pt requested to speak with Dr Connie Abdullahi. He said he needed to schedule some test for [place]. I asked if I could schedule his bloodwork in [facility] but he said he would rather talk to Dr Connie Abdullahi.
RICHB with Dr. Sunday meneses
Spoke with Angel Wilson and wife Shirley last night (Monday night) after work. Harv reports 50% decline every day since middle of last week, no quality of life, exhausted. He also sounds cognitively slower, having me repeat myself.   I speak with his wife Shirley and she
Spoke with Dr. Johnny Villeda. 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 They are considering immune therapy to stabilize the cancer and sometimes puts it in remission, but if he's declining quickly hospice may be more appropriate.   They are m
Spoke with patient. He has back pain, not urinating much, hurts to swallow, low appetite, feels like he can't walk like he has \"jennifer gehrigs\" like his body doesn't listen to him. His speech is slightly slurred.   He's been like this all day, his son has
“You can access the FollowHealth Patient Portal, offered by [facility], by registering with the following website: [email or web address]”

## 2020-11-18 PROBLEM — N17.9 ACUTE RENAL FAILURE, UNSPECIFIED ACUTE RENAL FAILURE TYPE (HCC): Status: ACTIVE | Noted: 2020-01-01

## 2020-11-18 PROBLEM — N30.01 ACUTE CYSTITIS WITH HEMATURIA: Status: ACTIVE | Noted: 2020-01-01

## 2020-11-18 NOTE — PROGRESS NOTES
[facility]  Progress Note     Mattie Best Patient Status:  Inpatient    1934 MRN [id number]   [facility] 3NE-A Attending Nitish Roach, [address] [facility] Day # 0 PCP Maulik Dasilva MD     Chief Complaint: Fatigue    S: Patient seen a (based on SCr of 2.68 mg/dL (H)). No results for input(s): PTP, INR in the last 168 hours. No results for input(s): TROP, CK in the last 168 hours. Imaging: Imaging data reviewed in Epic.     Medications:   • amLODIPine Besylate  10 mg Oral Da state of illness, I anticipate that, after discharge, patient will require TBD.

## 2020-11-18 NOTE — CONSULTS
Hem/Onc Report of Consultation    Patient Name: Chelle Garza   YOB: 1934   Medical Record Number: [id number]   CSN: [id number]   Consulting Physician: Dr. Lisa Aase   Referring Provider(s): Dr. Raul Chase   Date of Consultation: 11/18/2020     Reas pressure    • History of pulmonary embolism 2/5/2013   • Impaired fasting glucose 7/15/10   • Kidney mass     stable x 7 years   • Nonspecific abnormal electrocardiogram (ECG) (EKG) 4/27/11   • Personal history of antineoplastic chemotherapy     intra blad phone: Not on file        Gets together: Not on file        Attends [employer] service: Not on file        Active member of club or organization: Not on file        Attends meetings of clubs or organizations: Not on file        Relationship status: Not on f Intravenous, Once    •  [COMPLETED] LORazepam (ATIVAN) injection 0.5 mg, 0.5 mg, Intravenous, Once    •  [COMPLETED] Piperacillin Sod-Tazobactam So (ZOSYN) 3.375 g in dextrose 5 % 100 mL ADD-vantage, 3.375 g, Intravenous, Once        Home Medications:  No Ht 1.651 m (5' 5\")   Wt 67.5 kg (148 lb 14.4 oz)   SpO2 96%   BMI 24.78 kg/m²   HEENT: EOMs intact. PERRL. Oropharynx is clear. Chest: Clear to auscultation. Heart: Regular rate and rhythm.    Abdomen: Soft, non tender, non distended  Extremities: Mild - 5.80 x10(6)uL    HGB 12.2 (L) 13.0 - 17.5 g/dL    HCT 33.4 (L) 39.0 - 53.0 %    .0 150.0 - 450.0 10(3)uL    MCV 81.9 80.0 - 100.0 fL    MCH 29.9 26.0 - 34.0 pg    MCHC 36.5 31.0 - 37.0 g/dL    RDW 14.2 11.0 - 15.0 %    RDW-SD 41.2 35.1 - 46.3 fL Esterase Urine Large (A) Negative    WBC Urine >50 (A) <5 /HPF    RBC URINE >10 (A) 0 - 2 /HPF    Bacteria Urine 1+ (A) None Seen    Squamous Epi.  Cells None Seen Small /LPF    Renal Tubular Epithelial Cells None Seen Small /LPF    Transitional Cells None PANEL (8)    Collection Time: 11/18/20  8:11 AM   Result Value Ref Range    Glucose 61 (L) 70 - 99 mg/dL    Sodium 120 (LL) 136 - 145 mmol/L    Potassium 5.2 (H) 3.5 - 5.1 mmol/L    Chloride 94 (L) 98 - 112 mmol/L    CO2 17.0 (L) 21.0 - 32.0 mmol/L    Anio Incidental note of a duplicated left mid superficial femoral vein. CONCLUSION:    1. No sonographic evidence for a deep venous thrombosis involving the lower extremities bilaterally.     Dictated by (CST): Vanessa Rangel MD on 11/18/2020 at 6:39 AM       F portions of the liver demonstrate heterogeneous echogenicity of the parenchyma. Small to moderate amount of ascites. Bilateral pleural effusions. DOPPLER WAVE FORMS  FLOW:  There is normal arterial and venous Doppler wave forms.   The spectral anal Here with confusion and altered renal and liver function. Noted to have jaundice, hyponatremia and Acute on chronic KI. 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   Had biopsy recently 2013, Factor V Leiden heterozygous. Bilateral lower extremity doppler ultrasound negative for DVT. Spoke to wife.     Nolan Vaughn MD

## 2020-11-18 NOTE — PLAN OF CARE
Assumed patient care @0366  Patient is aox4  On room air  Nsr on tele,   Last bm 11/15  Incontinent of urine, urinary male pouch placed, patient states it hurts and burns when he urinates  Bed bound until PT evaluates him  NPO, sips w/meds until slp evelin

## 2020-11-18 NOTE — PLAN OF CARE
Assumed care at 0730, A/Ox3. Thought he was in [place]. NSR on tele. R/A. Briefed. Urine pouch. Urine labs need to be obtained. Bicarb drip at 75. Hypoglycemic event early shift. Gave oral glucose and pt went to Ab U/S. Waiting to re-assess.  Will contin

## 2020-11-18 NOTE — CONSULTS
[facility]                       Gastroenterology Consultation-[facility]    Carolin Strange Benjichi Patient Status:  Inpatient    1934 MRN [id number]   [facility] 3NE-A Attending Vivian Kyle, [address] [facility] Day # 0 PCP Nadyne Closs Surgical History:   Procedure Laterality Date   • CATARACT     • CHOLECYSTECTOMY     • HERNIA SURGERY      inguinal hernia x2   • LAPAROSCOPY, SURGICAL PROSTATECTOMY, RETROPUBIC RADICAL, W/NERVE SPARING     • OTHER      bladder repair   • OTHER SURGICAL HI of colon cancer or other gastrointestinal malignancies; No family history of ulcer disease, or inflammatory bowel disease  ROS:  In addition to the pertinent positives described above:             Infectious Disease:  No chronic infections or recent fevers HCT 35.1 11/18/2020    .0 11/18/2020    CREATSERUM 2.68 11/18/2020    BUN 72 11/18/2020     11/18/2020    K 5.2 11/18/2020    CL 94 11/18/2020    CO2 17.0 11/18/2020    GLU 61 11/18/2020    CA 8.0 11/18/2020    ALB 2.5 11/18/2020    HOS[facility] [address] [place] [address] parenchyma. Small to moderate amount of ascites. Bilateral pleural effusions. DOPPLER WAVE FORMS  FLOW:  There is normal arterial and venous Doppler wave forms. The spectral analysis is within normal limits.   Reversed flow within the left portal vei intake. Elevated liver chemistries and concern for elevated TB w/ concern for biliary obstruction. RUQ US was ordered w/ dopplers but does not show biliary tree. Will need RUQ US, and based on this MRCP.  If concern for obstruction will need ERCP as necessa

## 2020-11-18 NOTE — ED NOTES
Pt given IV Ativan as ordered. He c/o, \"I have nervous legs, they're restless, have not been sleeping for 4 days. \"

## 2020-11-18 NOTE — CONSULTS
[facility]  Report of Consultation    Dennise Jesse Best Patient Status:  Inpatient    1934 MRN [id number]   [facility] 3NE-A Attending April Shay, [address] [facility] Day # 0 PCP Sreekanth Ramirez MD     Reason for Consultation:  Hyponatremia bladder repair   • OTHER SURGICAL HISTORY      bladder cancer removal   • OTHER SURGICAL HISTORY      prostatectomy     Family History   Problem Relation Age of Onset   • Hypertension Sister    • Cancer Other         Colon   • Heart Disease Other    • Stephanie Resendez EOM-I. PERRL  Neck: No lymphadenopathy. No JVD. No carotid bruits. Respiratory: Clear to auscultation bilaterally. Cardiovascular: S1, S2.  Regular rate and rhythm. Abdomen: Soft, nontender, nondistended. Neurologic: No focal neurological deficits. intermittently  2. Mild hyperkalemia/acidosos - fluids adjusted; lasix prn  - monitor  3.  Hyponatremia- he appears euvolemic; probably low solute intake a factor  - check urine na/osm  - gentle isotonic fluid +/- lasix  - monitor to avoid rapid correction

## 2020-11-18 NOTE — H&P
[facility]  History and Physical     Farzad Best Patient Status:  Emergency    1934 MRN [id number]   Location [facility] Attending Patricia Delgado MD   Hosp Day # 0 PCP Nathalia Carrasco MD     Chief Complaint: f OTHER SURGICAL HISTORY      prostatectomy       Social History:  reports that he has never smoked. He has never used smokeless tobacco. He reports that he does not drink alcohol or use drugs.     Family History:   Family History   Problem Relation Age of On lb (61.2 kg)   SpO2 98%   BMI 22.47 kg/m²   General: No acute distress. Alert and oriented x 3.jaundice   HEENT: Normocephalic atraumatic. Moist mucous membranes. EOM-I.  PERRLA.icteric   Neck: No lymphadenopathy  Respiratory: Clear to auscultation bilatera Quality:  · DVT Prophylaxis: heparin   · CODE status: full  · Lee: none  · If COVID testing is negative, may discontinue isolation: yes     Plan of care discussed with pt, wife, Cata Huff MD  11/17/2020

## 2020-11-18 NOTE — PROGRESS NOTES
NURSING ADMISSION NOTE      Patient admitted via Cart  Oriented to room 3603  Safety precautions initiated. Bed in low position. Call light in reach. Admission navigator completed. all other ROS negative except as per HPI

## 2020-11-18 NOTE — ED INITIAL ASSESSMENT (HPI)
Pt presents to ed c/o increased weakness x4-5 days. Pt states oncologist recommended he be evaluated in ed.

## 2020-11-18 NOTE — ED PROVIDER NOTES
Patient Seen in: [facility] Emergency Department      History   Patient presents with:  Fatigue    Stated Complaint: increased weakness, cognitive issues per family, sent by doctor    HPI    70-year-old white male with a history of metastatic cancer Alcohol/week: 0.0 standard drinks    Drug use: No             Review of Systems    Positive for stated complaint: increased weakness, cognitive issues per family, sent by doctor  Other systems are as noted in HPI. Constitutional and vital signs reviewed. limits   URINALYSIS WITH CULTURE REFLEX - Abnormal; Notable for the following components:    Urine Color Jasmin (*)     Clarity Urine Cloudy (*)     Blood Urine Large (*)     Protein Urine 30  (*)     Leukocyte Esterase Urine Large (*)     WBC Urine >50 (*)  Shallow inspiration.  Mild subsegmental atelectasis at the right base versus early/mild pneumonia.  Possible small right pleural effusion.  Continued clinical correlation and follow-up recommended.               MDM      Patient had an IV established here

## 2020-11-18 NOTE — TELEPHONE ENCOUNTER
[facility] called she is on the HIPPA form  She states that she lives in [place] and is looking for an update on her dad  She states that she knows he is at the hospital and was waiting for a bed  Advised that it appears that he has been assigned to a bed and g

## 2020-11-19 PROBLEM — R17 JAUNDICE: Status: ACTIVE | Noted: 2020-01-01

## 2020-11-19 NOTE — PROGRESS NOTES
[facility] Pharmacy Note:  Renal Adjustment for piperacillin/tazobactam (Daly Kan)    Yosef Alatorre is a 80year old patient who has been prescribed piperacillin/tazobactam (ZOSYN) 3.375 g every 8 hrs.   CrCl is estimated creatinine clearance is 15.8 mL/min (A) (bas

## 2020-11-19 NOTE — CM/SW NOTE
11/19/20 0900   CM/SW Referral Data   Referral Source Physician;Social Work (self-referral)   Reason for Referral Discharge planning   Informant Spouse;[facility] Staff   Patient Info   Patient's Mary Fragoso

## 2020-11-19 NOTE — PLAN OF CARE
Pt. A&Ox2-4 can be forgetful  RA;VSS  Tele- NSR  BMP Q4- Na in 120's MD aware  BICARB drip @ 100; bicarb 50 cc push given this shift  NPO- speech to see; Q6 accucheck MID- 86(on accucheck), 0500; BG 78 (on accucheck)- critical from lab (62), MD notified.  Ruth Ann Sas

## 2020-11-19 NOTE — PROGRESS NOTES
[facility] Gastroenterology     Michele Satya Best Patient Status:  Inpatient    1934 MRN [id number]   [facility] 3NE-A Attending Renuka Martinez, [address] [facility] Day # 1 PCP Monse Marcus MD       R distress  Abd: Soft, non-tender, non-distended. No rebound tenderness, no guarding. Skin:  Jaundice present  Neuro: Aox3.      Diagnostic Data:      Labs:  Recent Labs   Lab 11/17/20  1948 11/18/20  0345 11/19/20  0446   WBC 16.2* 15.0* 12.4*   HGB 12.2* throughout liver admitted with failure to thrive, fatigue. Poor PO intake. Elevated liver chemistries and concern for elevated TB w/ concern for biliary obstruction. RUQ US was ordered w/ dopplers but does not show biliary tree.  Patent vessels    RUQ US or

## 2020-11-19 NOTE — PROGRESS NOTES
[facility]  Progress Note     Lacy Beni Nasir Patient Status:  Inpatient    1934 MRN [id number]   [facility] 3NE-A Attending Karen Guerra, [address] [facility] Day # 1 PCP Luis Whyte MD     Chief Complaint: Fatigue    S: Patient seen a 4.7  4.7 5.5*   CL 89* 90*   < > 95* 92*  92* 94*   CO2 17.0* 16.0*   < > 16.0* 19.0*  19.0* 17.0*   ALKPHO 1,023*  995* 966*  --   --  917*  --    *  321* 306*  --   --  641*  --    *  247* 232*  --   --  316*  --    BILT 8.8*  8.7* 9.2*  -- TCC on discharge?: tbd  Estimated date of discharge: tbd  Discharge is dependent on: clinical progress  At this point Mr. Sarah Paz is expected to be discharge to: tbd    Plan of care discussed with patient and RN.      Pascual Montes, DO

## 2020-11-19 NOTE — DIETARY NOTE
[facility]    NUTRITION ASSESSMENT    Pt does not meet malnutrition criteria.     NUTRITION DIAGNOSIS/PROBLEM:    Increased nutrient needs related to increased demands of catabolic illness as evidenced by metastatic CA    NUTRITION INTERVENTION: maximize  Food Allergies: No  Cultural/Ethnic/Anglican Preferences Addresses: Yes    NUTRITION RELATED PHYSICAL FINDINGS:     1. Body Fat/Muscle Mass: mild depletion muscle mass per visual exam.     2. Fluid Accumulation: none per visual exam.    Jose Manuel Henriquez

## 2020-11-19 NOTE — PLAN OF CARE
Assumed care at 0730. Pt a/ox3, VSS, on RA, NSR on telemetry. Pt with no c/o pain, n/v/d, SOB, dizziness/lightheadedness. Daily wt. Zosyn d/c'd. PVR qshift. I&Os. Plan for MRCP today. Jaundice skin and sclera noted.  IV running D5.45 with bicarb at 100mL/hr

## 2020-11-19 NOTE — PROGRESS NOTES
[facility]  Progress Note    Garciaangelito Best Patient Status:  Inpatient    1934 MRN [id number]   [facility] 3NE-A Attending Harman Soto, [address] [facility] Day # 1 PCP Jarrell Buenrostro MD     No acute events overnight  He had to be straight 11/18/20  0345 11/19/20  0446   WBC 16.2* 15.0* 12.4*   HGB 12.2* 12.7* 10.8*   MCV 81.9 83.2 82.7   .0 369.0 303.0   INR  --   --  1.20*       Recent Labs     11/18/20  1306 11/18/20  1738 11/18/20 2000 11/18/20  2358 11/19/20  0446 11/19/20  1466

## 2020-11-19 NOTE — SLP NOTE
ADULT SWALLOWING EVALUATION    ASSESSMENT    ASSESSMENT/OVERALL IMPRESSION:  Order received for BSE per RN dysphagia screen. Pt currently NPO and RN approved for SLP to proceed. Pt received awake, alert, and in seated upright position in bed.  Pt noted wit pressure    • History of pulmonary embolism 2/5/2013   • Impaired fasting glucose 7/15/10   • Kidney mass     stable x 7 years   • Nonspecific abnormal electrocardiogram (ECG) (EKG) 4/27/11   • Personal history of antineoplastic chemotherapy     intra blad

## 2020-11-20 PROBLEM — C66.9 CANCER OF URETER (HCC): Status: ACTIVE | Noted: 2020-01-01

## 2020-11-20 NOTE — PROGRESS NOTES
[address] [facility] Gastroenterology     Dennise Best Patient Status:  Inpatient    1934 MRN [id number]   [facility] 3NE-A Attending April Shay, [address] [facility] [address] Day # 2 PCP Sreekanth Ramirez MD       R CTAB, no respiratory distress  Abd: Soft, non-tender, non-distended. No rebound tenderness, no guarding. Skin:  Jaundice present  Neuro: Aox3. Diagnostic Data:      Labs:  Recent Labs   Lab 11/17/20  1948 11/18/20  0345 11/19/20  0446   WBC 16.2* 15. (SEE COMMENTS)    Comment:Likely cause of C Dif Infxn in 11/2017    Imaging:  I have personally reviewed all pertinent available imaging. MRCP 11/19/20  CONCLUSION:     1. MRCP only imaging was performed as clinically requested.   No intrahepatic or e

## 2020-11-20 NOTE — CM/SW NOTE
12:26pm  MSW spoke to RN and got a message from hospice. Plan is home on 11/21 with residential hospice. 9am  Pt discussed in care rounds, MDO for hospice.   MSW paged [facility]  care with referral.  Liaison will meet with the patient/family

## 2020-11-20 NOTE — PLAN OF CARE
Assumed pt care at 0730. Renal diet. Accuchecks QID. Daily weight. I&Os. A/O x4. Dentues, glasses, and hearing aide at bedside. On RA. NSR on tele. Lee in place, draining tea colored urine. C/O back pain, norco given.  Up with x2 assist. Will continue to

## 2020-11-20 NOTE — PROGRESS NOTES
[facility]  Progress Note     Demetra Montana Nasir Patient Status:  Inpatient    1934 MRN [id number]   [facility] 3NE-A Attending Ivon Segovia, [address] [facility] Day # 2 PCP Viridiana Tucker MD     Chief Complaint: Fatigue    S: Patient seen a ALB 2.7*  2.7* 2.5*  --  2.1*  --   --   --   --    * 119*   < > 124*  124*   < > 123* 125* 126*   K 5.4* 4.9   < > 4.7  4.7   < > 4.5 4.3 4.3   CL 89* 90*   < > 92*  92*   < > 91* 94* 94*   CO2 17.0* 16.0*   < > 19.0*  19.0*   < > 19.0* 21.0 21.0 11. Disposition  1.  Plan tentatively for d/c home with home hospice once arrangements made    Quality:  · DVT Prophylaxis: SCD  · CODE status: Full  · Lee: No  · Central line: No  · If COVID testing is negative, may discontinue isolation: Yes     Will

## 2020-11-20 NOTE — PLAN OF CARE
Resumed care of pt. At 1915. Pt. Is A3-4, resting in bed. Daily wt.   Strict I&O's    Renal Diet----Accu: QID    Heparin SubQ    Jaundice skin and sclera    RA, Tele: NS-    Lee-tea colored    Norco given via MAR for Chronic Back Pain    LAC PIV: N

## 2020-11-20 NOTE — PROGRESS NOTES
[facility]  Progress Note    Lacy Best Patient Status:  Inpatient    1934 MRN [id number]   [facility] 3NE-A Attending Karen Guerra, [address] [facility] Day # 2 PCP Luis Whyte MD     No acute events overnight  S/p dipti   Denies any erythema. Psychiatric: Appropriate mood and affect.     Recent Labs     11/17/20  1948 11/18/20  0345 11/19/20  0446   WBC 16.2* 15.0* 12.4*   HGB 12.2* 12.7* 10.8*   MCV 81.9 83.2 82.7   .0 369.0 303.0   INR  --   --  1.20*       Recent Labs     11

## 2020-11-20 NOTE — HOSPICE RN NOTE
Met with patient and family to discuss hospice philosophy, services and levels of care. Family to have zoom meeting this evening to discuss plan.   Family states that they need time to arrange for private caregiver and to arrange home so that they are able

## 2020-11-20 NOTE — PHYSICAL THERAPY NOTE
PT order received for an eval, however spoke with RN Monty Weber, pt and his family have decided to DC home c hospice care. No further needs at this time. PT will sign off.

## 2020-11-20 NOTE — PROGRESS NOTES
Heme/Onc Progress Note    Patient Name: Tahira Ortega   YOB: 1934   Medical Record Number: [id number]   CSN: [id number]   Attending Physician: Kika Reid M.D. Subjective:  Feels fairly clear-headed. Tolerated MRCP.   Not in much pa is supple. Chest: Clear to auscultation. Heart: Regular rate and rhythm. Abdomen: Soft, Mildly tender in RUQ with good bowel sounds. Extremities: Pedal pulses are present. No edema. Neurological: Grossly intact. Lymphatics:  There is no palpable lym SODIUM, URINE, RANDOM    Collection Time: 11/19/20  3:23 PM   Result Value Ref Range    Na Urine Random 44 mmol/L   BASIC METABOLIC PANEL (8)    Collection Time: 11/19/20  4:10 PM   Result Value Ref Range    Glucose 142 (H) 70 - 99 mg/dL    Sodium 123 (L 3-D RENDERING:  3D rendering was performed under my concurrent supervision. FINDINGS:       MRCP only imaging demonstrates no evidence of intrahepatic or extrahepatic bile duct dilatation.   The mid aspect of the extrahepatic common bile duct measures

## 2020-11-21 NOTE — PROGRESS NOTES
Hematology/Oncology Progress Note    Patient Name: Nieves Harding  Medical Record Number: [id number]    YOB: 1934     Reason for Consultation:  Nieves Harding was seen today for the diagnosis of metastatic urothelial carcinoma    Renee hayes 397.0 369.0 303.0   MCV 81.9 83.2 82.7   RDW 14.2 14.6 14.7   NEPRELIM 13.61*  --   --        Recent Labs   Lab 11/18/20  0345 11/18/20  0345 11/19/20  0446 11/19/20  0446 11/19/20  1610 11/20/20  0025 11/20/20  0751   *   < > 124*  124*   < > 123* 1

## 2020-11-21 NOTE — PLAN OF CARE
Resumed care of pt. At 1915. Pt. Is Ax3, resting in bed. Daily wt. , strict I's&O's    Scheduled Albumin  Accu QID    Heparin     Elim given via MAR    RA, Tele: NS, removes     Chronic Lee    PIV:Sodium BiCarb +50Meq in .9 Na chloride.     Plan is

## 2020-11-21 NOTE — PROGRESS NOTES
[facility]  Progress Note    Mary Jane Best Patient Status:  Inpatient    1934 MRN [id number]   [facility] 3NE-A Attending Ankur Augustin, [address] [facility] Day # 3 PCP Ritika Lacy MD     No acute events overnight  States he is jovani Clark \[facility]\"" nondistended. Neurologic: No focal neurological deficits. Musculoskeletal: Full range of motion of all extremities. + edema BLE  Integument: No lesions. No erythema. Psychiatric: Appropriate mood and affect.     Recent Labs     11/19/20  0446   WBC 12

## 2020-11-21 NOTE — PROGRESS NOTES
[facility]  Progress Note     Eduardo Best Patient Status:  Inpatient    1934 MRN [id number]   [facility] 3NE-A Attending Nehemiah Fletcher, [address] [facility] Day # 3 PCP Janel Veronica MD     Chief Complaint: Fatigue    S: Patient seen a *   < > 124*  124*   < > 125* 126* 129*   K 4.9   < > 4.7  4.7   < > 4.3 4.3 4.4   CL 90*   < > 92*  92*   < > 94* 94* 98   CO2 16.0*   < > 19.0*  19.0*   < > 21.0 21.0 21.0   ALKPHO 966*  --  917*  --  855*  --   --    *  --  641*  --  568* status: Full  · Lee: No  · Central line: No  · If COVID testing is negative, may discontinue isolation: Yes     Will the patient be referred to TCC on discharge?: tbd  Estimated date of discharge: 1-2 days  Discharge is dependent on: clinical progress  A

## 2020-11-21 NOTE — HOSPICE RN NOTE
Spoke with Family of patient (Spouse/son and daughter) They want the patient to come home on Monday. Writer informed them that equipment can be ordered today/tomorrow for delivery as well as comfort pack medications.  So this way everything will be in place

## 2020-11-21 NOTE — PLAN OF CARE
Patient A&O x 3-4. Room air, . NSR/ST on tele. Probable discharge home with hospice care. Per report, awaiting resources to arrive to family home. Pain controlled with Norco. QID accucheck.  Patient just wants to go home, does not want to eat breakfast t

## 2020-11-22 NOTE — PLAN OF CARE
Assumed care at 0730. A&O x 4. On RA tolerating well. Lee in place draining brown tinged urine. Dentures at bedside. Patient frustrated with not being able to go home today d/t hospice equipment not being delivered. Diet changed to general diet.  Brought

## 2020-11-22 NOTE — HOSPICE RN NOTE
Update:   Spoke with [employer] and  scheduled on 11/23 at 12pm to discharge to [address]      Spoke with patient and spouse at bedside.  Spouse signed consents for [facility] to be effective 11/23/

## 2020-11-22 NOTE — PROGRESS NOTES
Hematology/Oncology Progress Note    Patient Name: Prisca Chavis  Medical Record Number: [id number]    YOB: 1934     Reason for Consultation:  Prisca Chavis was seen today for the diagnosis of metastatic urothelial carcinoma    Interval abigail 11/18/20 0345 11/19/20 0446   WBC 16.2* 15.0* 12.4*   HGB 12.2* 12.7* 10.8*   HCT 33.4* 35.1* 29.7*   .0 369.0 303.0   MCV 81.9 83.2 82.7   RDW 14.2 14.6 14.7   NEPRELIM 13.61*  --   --        Recent Labs   Lab 11/18/20 0345 11/18/20 0345 11/19/ [facility]

## 2020-11-22 NOTE — PROGRESS NOTES
[facility]  Progress Note     Mattie Best Patient Status:  Inpatient    1934 MRN [id number]   [facility] 3NE-A Attending Nitish Roach, [address] [facility] Day # 4 PCP Maulik Dasilva MD     Chief Complaint: Fatigue    S: Patient seen a > 125* 126* 129*   K 4.9   < > 4.7  4.7   < > 4.3 4.3 4.4   CL 90*   < > 92*  92*   < > 94* 94* 98   CO2 16.0*   < > 19.0*  19.0*   < > 21.0 21.0 21.0   ALKPHO 966*  --  917*  --  855*  --   --    *  --  641*  --  568*  --   --    *  --  316* negative, may discontinue isolation: Yes     Will the patient be referred to TCC on discharge?: tbd  Estimated date of discharge: 1 day  Discharge is dependent on: clinical progress  At this point Mr. Zondra Boxer is expected to be discharge to: Home hospice

## 2020-11-22 NOTE — PLAN OF CARE
Resumed care of pt. At 1915. Pt. Is drowsy but responds appropriately.     Daily weight    Refused Heparin  RA, Tele: NS    Rosa    Boomer given via MAR    PIV:Na Bicarb 50meq in Na Chloride/100ml hr        Problem: Patient/Family Goals  Goal: Patient/Fami

## 2020-11-23 NOTE — TELEPHONE ENCOUNTER
Josesito Price from [facility]. called to Confirm that pt has diagnosis urophilia carcinoma    Please return call to [phone]

## 2020-11-23 NOTE — PLAN OF CARE
Problem: Patient/Family Goals  Goal: Patient/Family Long Term Goal  Description: Patient's Long Term Goal: to d/c    Interventions:  - MD follow up, medications, self care  - See additional Care Plan goals for specific interventions  Outcome: Progressing Prn medication given for sleep. Fall precautions in place. Plan to maintain comfort. Will continue to monitor.

## 2020-11-23 NOTE — CM/SW NOTE
SW finalized discharge planning , ambulance scheduled for 12pm.  PCS form done.  Awaiting signature for dnr

## 2020-11-23 NOTE — PLAN OF CARE
Assumed patient care at 7:30  A/O x3 (confused at times)  Room air (SOHAN but no CPAP)  NSR on tele   Lee C/D/I and working well  No pain noted by the patient   Regular diet   IV is C/D/I and SL   All safety precautions are in place and will continue to mo

## 2020-11-23 NOTE — RESPIRATORY THERAPY NOTE
Visited pt for SOHAN. Pt states he doesn't wear a CPAP or O2 at [facility]. Pt on room air. Will continue to monitor.

## 2020-11-23 NOTE — DISCHARGE PLANNING
Patient was educated on medications that were recommended to stop. IV and tele was taken off at time of discharge. Wife was notified that patient ETA was around 12:00. Answered all questions at the time of discharge.  EMT received all the paperwork at [facility]

## 2020-11-23 NOTE — TELEPHONE ENCOUNTER
Yes per ED notes on  11/17/2020 and problem list     Urothelial carcinoma ([facility] [address]  [C68.9]

## 2020-11-23 NOTE — PLAN OF CARE
Assumed care of patient at [phone], patient resting comfortably in bed. Patient did c/o back pain and requested to be repositioned, reported relief with repositioning. Patient continues to refuse SCD's and Heparin, even after rationale explained.  DNR order r

## 2020-11-23 NOTE — DISCHARGE SUMMARY
[facility]  DISCHARGE SUMMARY     Demarcus Best Patient Status:  Inpatient    1934 MRN [id number]   [facility] 3NE-A Attending Britney Salinas, [address] [facility] Day # 5 PCP Peyman Nayak MD     Date of Admission: 2020  Date of [facility] hospital.    Procedures during hospitalization:   • None    Incidental or significant findings and recommendations (brief descriptions):  • None    Lab/Test results pending at Discharge:   · None    Consultants:  • Nephrology  • GI  • Oncology  • Hospice  MG Tabs         [place]    Follow-up appointment:   No follow-up provider specified.   Appointments for Next 30 Days 11/23/2020 - 12/23/2020    None          Vital signs:  Temp:  [97.6 °F (36.4 °C)-98.2 °F (36.8 °C)] 97.6 °F (36.4 °C)  Pu

## 2020-11-23 NOTE — PROGRESS NOTES
Heme/Onc Progress Note    Patient Name: Jordon Garcia   YOB: 1934   Medical Record Number: [id number]   CSN: [id number]   Attending Physician: Jody Thomas M.D. Subjective:  Anxious to get home.   \"I want this to be over as soon as p Examination:  General: Patient is alert and oriented x 3, not in acute distress. Jaundiced.   Vital Signs: /41 (BP Location: Left arm)   Pulse 81   Temp 97.7 °F (36.5 °C) (Oral)   Resp 18   Ht 1.651 m (5' 5\")   Wt 67.2 kg (148 lb 3.2 oz)   SpO2 93%

## 2020-11-23 NOTE — PLAN OF CARE
Problem: Patient/Family Goals  Goal: Patient/Family Long Term Goal  Description: Patient's Long Term Goal: to d/c    Interventions:  - MD follow up, medications, self care  - See additional Care Plan goals for specific interventions  Outcome: Progressing

## 2020-11-23 NOTE — HOSPICE RN NOTE
POLST form still awaiting MD signature. Mikayla Bazan RN to have Dr. Irene Hood sign document. Confirmed with family that meds and DME have been delivered. Informed patient and family that 12:00  by [address] for transfer home.

## 2020-11-24 NOTE — TELEPHONE ENCOUNTER
[facility] at [phone] is calling to inform that patient has been admitted to routine home hospice on 11/23/20, Celine Bedoya would like to thank Dr. Ping Pisano for the privilege of caring for your patient.

## 2020-11-25 NOTE — PROGRESS NOTES
Reviewed pt chart prior to [facility] outreach, noted pt was d/c from the hospital on hospice. Attempted to reach out to pt to thank him for being part of the CCM program and offer my condolences.  Left message on vm, ended episode, removed myself from care team.

## 2020-11-30 ENCOUNTER — TELEPHONE (OUTPATIENT)
Dept: HEMATOLOGY/ONCOLOGY | Facility: HOSPITAL | Age: 85
End: 2020-11-30

## 2020-12-01 ENCOUNTER — TELEPHONE (OUTPATIENT)
Dept: FAMILY MEDICINE CLINIC | Facility: CLINIC | Age: 85
End: 2020-12-01

## 2020-12-01 NOTE — TELEPHONE ENCOUNTER
Death Certificate received from [facility]. Dr. Xiomara Dumont did sign. This has been faxed back.

## 2020-12-02 NOTE — CDS QUERY
Altered Mental Status/Mental Status Change Without History of Injury  CLINICAL DOCUMENTATION CLARIFICATION FORM  Dear Doctor Zechariah Douglas:  Clinical information (provided below) indicates altered mental status and/or mental status change without history of inju Specialist:   Margaux Palacios RN [id number]                                                                                            THIS FORM IS A PERMANENT PART OF THE MEDICAL RECORD

## 2020-12-05 ENCOUNTER — MED REC SCAN ONLY (OUTPATIENT)
Dept: FAMILY MEDICINE CLINIC | Facility: CLINIC | Age: 85
End: 2020-12-05

## 2021-07-14 NOTE — TELEPHONE ENCOUNTER
Patient states his son was advised that he most likely has COVID due to his wife being COVID positive. Tested yesterday--still waiting for results. Son has a sore throat.     Practicing social distancing 2 weeks ago at a family gathering but not wearing m 14-Jul-2021

## 2022-05-14 NOTE — PROGRESS NOTES
Mint tube drawn from right arm with 23g butterfly needle x1. Pt didier well.  Pt left the clinic in stable condition
normal performance

## 2023-11-28 NOTE — TELEPHONE ENCOUNTER
11/28/2023       RE: Tevin Guidry  [address]  [place] [address]     Dear Colleague,    Thank you for referring your patient, Tevin Guidry, to the [facility]. Please see a copy of my visit note below.    No notes on file    Again, thank you for allowing me to participate in the care of your patient.      Sincerely,    Kirt Guzman MD     Faxed order to [facility]

## 2024-05-17 NOTE — PROGRESS NOTES
Vinny Bond is a 80year old male. HPI:   Pt is here for a few reasons (he brings in a list and a timeline of his bladder treatments). 1) His bladder is so bothersome to him.  He has had several TB treatments and notes he typically has bladder inflamm Cap TAKE 1 CAPSULE(0.4 MG) BY MOUTH TWICE DAILY Disp: 180 capsule Rfl: 3   finasteride 5 MG Oral Tab Take 1 tablet (5 mg total) by mouth daily. Disp:  Rfl: 0   aspirin EC 81 MG Oral Tab EC Take 1 tablet (81 mg total) by mouth daily.  (Patient taking differe breath with exertion  CARDIOVASCULAR: denies chest pain on exertion  GI: denies abdominal pain and denies heartburn  : see HPI; intermittently passes blood as well;   NEURO: denies headaches    EXAM:   /70 mmHg  Pulse 80  Temp(Src) 98.1 °F (36.7 °C Refills for this Visit:  Signed Prescriptions Disp Refills    Phenazopyridine HCl (PYRIDIUM) 100 MG Oral Tab 30 tablet 1      Sig: Take 1 tablet (100 mg total) by mouth 3 (three) times daily as needed for Pain (for up to 2 days straight at a time prn for b Gamal Mathis  Critical Care Medicine  [address] [facility]  [place] [address]  Phone: [phone]  Fax: [phone]  Established Patient  Follow Up Time: 1 week    Ngozi Stearns  Orthopaedic Surgery  [address] [place] [address], [address]  [place] [address]  Phone: [phone]  Fax: [phone]  Follow Up Time: 1 week

## (undated) NOTE — ED AVS SNAPSHOT
Kaya Neffn   MRN: [id number]    Department:  [facility] Emergency Department in [place]   Date of Visit:  6/26/2018           Disclosure     Insurance plans vary and the physician(s) referred by the ER may not be covered by your plan.  Please contact tell this physician (or your personal doctor if your instructions are to return to your personal doctor) about any new or lasting problems. The primary care or specialist physician will see patients referred from the [facility] Emergency Department.  Matt Awad

## (undated) NOTE — LETTER
10/26/20          Lisa Best   [address] [address]           Dear Sara Smith records indicate that you have outstanding lab work and or testing that was ordered for you and has not yet been completed:  Lab Frequency Next O

## (undated) NOTE — Clinical Note
Pt is coming for HFU on 2/26/20. Pt stated he is much better since d/c. Attempted to review medication list but pt stated nothing new was started and did not go over them. Please complete med rec at [facility] appt. Thank you.

## (undated) NOTE — Clinical Note
FYI, TCM call made, see notes. KATHERIN attempted to schedule a TCM HFU, patient states he will call Dr. Xiomara Dumont himself to schedule an appointment. Message sent to MD's office.

## (undated) NOTE — ED AVS SNAPSHOT
THE [facility] Emergency Department in [address] [facility]    Phone:  [phone]    Fax:  [phone]           Blade Argueta   MRN: [id number]    Department:  THE [facility] Emergency Department in [place]   Date of Visit:  1 Expect to receive an electronic request (by e-mail or text) to complete a self-assessment the day after your visit. You may also receive a call from our patient liason soon after your visit.  Also, some patients receive a detailed feedback survey mailed to [address] [phone] [address] ([address] [facility] [id number] [address] ([address]) [address] [place] [address] Visits < Visit Summaries. MyChart questions? Call [phone] for help. intelloCut is NOT to be used for urgent needs. For medical emergencies, dial 911.

## (undated) NOTE — ED AVS SNAPSHOT
THE [facility] in [facility] [address] [facility] [address] [address]    Phone:  [phone]    Fax:  Liudmila Mccarty.   MRN: [id number]    Department:  THE [facility] in [place]   Date of Visit:  1/21/2017           Diagnos self-assessment the day after your visit. You may also receive a call from our patient liason soon after your visit. Also, some patients receive a detailed feedback survey mailed to them a week after the visit.   If you receive this, we would really apprec [facility] [address] [facility] [address] (68 [facility] [id number] [address] ([address]) [facility] [address]. [address] [facility]. ([address]) [facility]

## (undated) NOTE — ED AVS SNAPSHOT
Jennifer Prater   MRN: [id number]    Department:  THE [facility] Emergency Department in [place]   Date of Visit:  11/4/2019           Disclosure     Insurance plans vary and the physician(s) referred by the ER may not be covered by your plan.  Please contact tell this physician (or your personal doctor if your instructions are to return to your personal doctor) about any new or lasting problems. The primary care or specialist physician will see patients referred from the [facility] Emergency Department.  Ulysses Porter

## (undated) NOTE — IP AVS SNAPSHOT
[address]            (For Outpatient Use Only) Initial Admit Date: 11/17/2020   Inpt/Obs Admit Date: Inpt: 11/18/20 / Obs: N/A   Discharge Date:    Maricel Martinez:  [de-identified]   MRN: [de-identified]   CSN: [id number]   CEID: [id number] Group Number:  Insurance Type:    Subscriber Name:  Subscriber :    Subscriber ID:  Pt Rel to Subscriber:    Hospital Account Financial Class: Medicare    2020

## (undated) NOTE — IP AVS SNAPSHOT
Patient Demographics     Address  [place] [address] Phone  [phone] [facility])  [phone] (Mobile) *Preferred* E-mail Address  [email or web address]      Emergency Contact(s)     Name Relation Home Work Mobile    Jennie Best Spouse 6 These medications were sent to [address], [address] AT [address] [place] [address] 34, [phone], [phone]  [place] [address], [address] [facility] [address]    Phone: [phone]   docusate sodium 100 MG Caps  HYDROco H&P signed by Nubia Lozada MD at 11/17/2020 11:21 PM  Version 1 of 1    Author: Nubia Lozada MD Service: — Author Type: Physician    Filed: 11/17/2020 11:21 PM Date of Service: 11/17/2020 11:01 PM Status: Signed    : Nubia Lozada MD (Physician) Procedure Laterality Date   • CATARACT     • CHOLECYSTECTOMY     • HERNIA SURGERY      inguinal hernia x2   • LAPAROSCOPY, SURGICAL PROSTATECTOMY, RETROPUBIC RADICAL, W/NERVE SPARING     • OTHER      bladder repair   • OTHER SURGICAL HISTORY      bladder c •  Cholecalciferol (VITAMIN D) 2000 UNITS Oral Cap, Take 1 capsule by mouth daily. , Disp: , Rfl:         Review of Systems:   A comprehensive 14 point review of systems was completed. Pertinent positives and negatives noted in the HPI.     Physical Exa 2. consider imaging of brain if not improved   4. Jaundice  1. Due to liver mets   5. metobolic acidosis   1. Due to MARIO  2. IVF, BMP  6. Hyperkalemia- BMP in am   1. Tele    7. Metastatic urothelial carcinoma   1. Oncology consult   2.  Consider reaching o Dg Reed presented [facility] room yesterday with complaints of weakness[LG.3] over the past week. His spouse also noticed that Dg Reed appeared yellow. He has also had a poor appetite. He reports diffuse abdominal pain when drinking water. [LG.2] Concerns:        Caffeine Concern: Not Asked        Exercise: Not Asked        Seat Belt: Not Asked        Special Diet: Not Asked        Stress Concern: Not Asked        Weight Concern: Not Asked    Social History Narrative      Not on file      Aller •  amLODIPine Besylate 10 MG Oral Tab, Take 1 tablet (10 mg total) by mouth daily. •  Lovastatin 10 MG Oral Tab, Take 10 mg by mouth daily. •  Montelukast Sodium 10 MG Oral Tab, Take 10 mg by mouth nightly.     •  tamsulosin (FLOMAX) cap, Take 0.4 mg Neurological: Grossly intact. [LG.1] Generalized weakness[LG.4]   Psych/Depression: mood and affect are appropriate    Labs:  Recent Results (from the past 24 hour(s))   RAINBOW DRAW BLUE    Collection Time: 11/17/20  7:48 PM   Result Value Ref Range    Hol Neutrophil Absolute 13.61 (H) 1.50 - 7.70 x10(3) uL    Lymphocyte Absolute 0.60 (L) 1.00 - 4.00 x10(3) uL    Monocyte Absolute 1.50 (H) 0.10 - 1.00 x10(3) uL    Eosinophil Absolute 0.01 0.00 - 0.70 x10(3) uL    Basophil Absolute 0.07 0.00 - 0.20 x10(3) uL Yeast Urine None Seen None Seen    Non-Squamous Epithelial None Seen None Seen   RAPID SARS-COV-2 BY PCR    Collection Time: 11/17/20  9:31 PM    Specimen: Nares;  Other   Result Value Ref Range    Rapid SARS-CoV-2 by PCR Not Detected Not Detected   COMP M Creatinine 2.68 (H) 0.70 - 1.30 mg/dL    BUN/CREA Ratio 26.9 (H) 10.0 - 20.0    Calcium, Total 8.0 (L) 8.5 - 10.1 mg/dL    Calculated Osmolality 269 (L) 275 - 295 mOsm/kg    GFR, Non- 21 (L) >=60    GFR, -American 24 (L) >=60   POCT Finalized by (CST): Marylin Henriquez MD on 11/18/2020 at 6:40 AM       =====================================    PROCEDURE:  US KIDNEY/BLADDER (CPT=76770)     COMPARISON:  Inocencia Cabrera,  KIDNEYS (CLM=[id number]), 1/07/2020, 11:36 AM.     INDICATIONS:  increased weakn FLOW:  There is normal arterial and venous Doppler wave forms. The spectral analysis is within normal limits. Reversed flow within the left portal vein, correlate clinically. CONCLUSION:    1. Vessels are patent as detailed above.   2. Heterogeneou Patient seen and examined. Reviewed chart and outside records independently and spoke to patient's wife by phone. Here with confusion and altered renal and liver function. Noted to have jaundice, hyponatremia and Acute on chronic KI.    Has history fo tr AMS/weakness: likely encephalopathy related to electrolyte abnormalities[LG.2], would expect to improve once abnormalities corrected. [LG.4]     Lower extremity edema: history of DVT in 2013[LG.2], Factor V Leiden heterozygous. [LG.4] Bilateral lower extremi Author: EUGENIO Bowser Service: Rehab Author Type: Speech Pathologist    Filed: 11/19/2020  9:51 AM Date of Service: 11/19/2020  9:25 AM Status: Signed    : EUGENIO Bowser (Speech Pathologist)       ADULT SWALLOWING EVALUATION    ASSESSMENT • Cancer [facility]) [id number]    Bladder   • Cataract    • DVT (deep venous thrombosis) ([facility].) 2/5/2013   • Essential hypertension    • Essential hypertension, benign 10/26/09   • Hearing impairment     bilateral hearing aids   • High blood pressure    • History If you have any questions, please contact Pamela Thao MS CCC-SLP/L, pager [phone]  Speech-LanguagePathologist[[email or web address].[email or web address]]      Electronically signed by EUGENIO Siegel on 11/19/2020  9:51 AM   Attribution Key    NOHEMY 1 - EUGENIO Siegel on 11/19

## (undated) NOTE — ED AVS SNAPSHOT
[facility] Emergency Department    [place]  [address] [place] [address]    Phone:  [phone]    Fax:  [address] [facility]   MRN: [id number]    Department:  [facility] Emergency Department   Date of Visit:  1/21/20 IF THERE IS ANY CHANGE OR WORSENING OF YOUR CONDITION, CALL YOUR PRIMARY CARE PHYSICIAN AT ONCE OR RETURN IMMEDIATELY TO THE EMERGENCY DEPARTMENT.     If you have been prescribed any medication(s), please fill your prescription right away and begin taking t

## (undated) NOTE — ED AVS SNAPSHOT
THE [facility] Emergency Department in [address] [facility]    Phone:  [phone]    Fax:  [phone]           Rufinoelmo Curiel   MRN: [id number]    Department:  THE [facility] Emergency Department in [place]   Date of Visit:  6 To Check ER Wait Times:  TEXT 'ERwait' to 93880      Click [email or web address]      Or call [phone]    If you have any problems with your follow-up, please call our  at [phone]    Si usted tiene algun problema con carrasco sequimiento, por f I have read and understand the instructions given to me by my caregivers. 24-Hour Pharmacies        Pharmacy Address Phone Number   [address]  [address] ([address]) Neftali Salomon view more details from this visit by going to [email or web address]. If you've recently had a stay at the Hospital you can access your discharge instructions in [employer] by going to Visits < Admission Summaries.  If you've been to the Emergency Depar

## (undated) NOTE — ED AVS SNAPSHOT
[facility] in [address] [facility]    Phone:  [phone]    Fax:  [phone]           Allen Alarcon   MRN: [id number]    Department:  [facility] in [place]   Date of Visit:  6 IF THERE IS ANY CHANGE OR WORSENING OF YOUR CONDITION, CALL YOUR PRIMARY CARE PHYSICIAN AT ONCE OR RETURN IMMEDIATELY TO THE EMERGENCY DEPARTMENT.     If you have been prescribed any medication(s), please fill your prescription right away and begin taking t

## (undated) NOTE — ED AVS SNAPSHOT
[facility] in [address] [place] [address]    Phone:  [phone]    Fax:  [phone]           Mainor Treviño   MRN: [id number]    Department:  [facility] in [place]   Date of Visit:  1 IF THERE IS ANY CHANGE OR WORSENING OF YOUR CONDITION, CALL YOUR PRIMARY CARE PHYSICIAN AT ONCE OR RETURN IMMEDIATELY TO THE EMERGENCY DEPARTMENT.     If you have been prescribed any medication(s), please fill your prescription right away and begin taking t

## (undated) NOTE — ED AVS SNAPSHOT
Prisca Chavis   MRN: [id number]    Department:  [facility] Emergency Department   Date of Visit:  3/2/2019           Disclosure     Insurance plans vary and the physician(s) referred by the ER may not be covered by your plan.  Please contact your tell this physician (or your personal doctor if your instructions are to return to your personal doctor) about any new or lasting problems. The primary care or specialist physician will see patients referred from the [facility] Emergency Department.  Antonio Mario

## (undated) NOTE — MR AVS SNAPSHOT
[address] [facility] [id number] [phone]               Thank you for choosing us for your health care visit with [facility].   We are glad to serve you and happy to provide you with this hydrochlorothiazide 25 MG Tabs   TAKE ONE TABLET BY MOUTH EVERY DAY   Commonly known as:  HYDRODIURIL           Irbesartan 300 MG Tabs   TAKE 1 TABLET BY MOUTH EVERY EVENING           LORazepam 0.5 MG Tabs   TAKE 1 TABLET BY MOUTH THREE TIMES DAILY AS NEE Make half your plate fruits and vegetables Highly refined, white starches including white bread, rice and pasta   Eat plenty of protein, keep the fat content low Sugars:  sodas and sports drinks, candies and desserts   Eat plenty of low-fat dairy products

## (undated) NOTE — ED AVS SNAPSHOT
[facility] Emergency Department    [place]  [address] [place] [address]    Phone:  [phone]    Fax:  [address] [facility]   MRN: [id number]    Department:  [facility] Emergency Department   Date of Visit:  1/21/20 Discharge References/Attachments     BACK CARE TIPS (ENGLISH)    BACK PAIN (ACUTE OR CHRONIC) (ENGLISH)      Disclosure     Insurance plans vary and the physician(s) referred by the ER may not be covered by your plan.  Please contact your insurance company If you have been prescribed any medication(s), please fill your prescription right away and begin taking the medication(s) as directed    If the emergency physician has read X-rays, these will be re-interpreted by a radiologist.  If there is a significant can help with your Affordable Care Act coverage, as well as all types of Medicaid plans. To get signed up and covered, please call [phone] and ask to get set up for an insurance coverage that is in-network with Dali Chiu CARDIAC:           Normal.  LUNGS:             There is a 3 mm nodule in the right upper lobe on image 80. There is a 3 mm nodule in the left lower lobe adjacent to the fissure on image 114.     There is a groundglass nodule in the left upper lobe measur atelectasis. Stable blunting of left costophrenic angle. MyChart     Visit [employer]  You can access your MyChart to more actively manage your health care and view more details from this visit by going to [email or web address].   If you've

## (undated) NOTE — MR AVS SNAPSHOT
[address] [facility] [address] [phone]               Thank you for choosing us for your health care visit with Vernon Pompa MD.  We are glad to serve you and happy to provide you with this sum TAKE 1 TABLET BY MOUTH EVERY EVENING           LORazepam 0.5 MG Tabs   TAKE 1 TABLET BY MOUTH THREE TIMES DAILY AS NEEDED   Commonly known as:  ATIVAN           Lovastatin 10 MG Tabs   Take 1 tablet (10 mg total) by mouth nightly.            Medical Josh

## (undated) NOTE — LETTER
02/18/20        Diaz Best  [address] [address]      Dear Jakob Quan,    Our records indicate that you have outstanding lab work and or testing that was ordered for you and has not yet been completed:  Lab Frequency Next Occurrence   CB

## (undated) NOTE — ED AVS SNAPSHOT
Enzo Cabezas   MRN: [id number]    Department:  THE [facility] Emergency Department in [place]   Date of Visit:  2/7/2019           Disclosure     Insurance plans vary and the physician(s) referred by the ER may not be covered by your plan.  Please contact tell this physician (or your personal doctor if your instructions are to return to your personal doctor) about any new or lasting problems. The primary care or specialist physician will see patients referred from the [facility] Emergency Department.  Ruth Sellers